# Patient Record
Sex: FEMALE | Race: WHITE | NOT HISPANIC OR LATINO
[De-identification: names, ages, dates, MRNs, and addresses within clinical notes are randomized per-mention and may not be internally consistent; named-entity substitution may affect disease eponyms.]

---

## 2024-07-10 ENCOUNTER — APPOINTMENT (OUTPATIENT)
Dept: OBGYN | Facility: CLINIC | Age: 29
End: 2024-07-10

## 2024-07-10 ENCOUNTER — ASOB RESULT (OUTPATIENT)
Age: 29
End: 2024-07-10

## 2024-07-10 ENCOUNTER — APPOINTMENT (OUTPATIENT)
Dept: ANTEPARTUM | Facility: CLINIC | Age: 29
End: 2024-07-10
Payer: COMMERCIAL

## 2024-07-10 DIAGNOSIS — Z00.00 ENCOUNTER FOR GENERAL ADULT MEDICAL EXAMINATION W/OUT ABNORMAL FINDINGS: ICD-10-CM

## 2024-07-10 DIAGNOSIS — Z36.82 ENCOUNTER FOR ANTENATAL SCREENING FOR NUCHAL TRANSLUCENCY: ICD-10-CM

## 2024-07-10 PROCEDURE — 76801 OB US < 14 WKS SINGLE FETUS: CPT

## 2024-07-10 PROCEDURE — 76813 OB US NUCHAL MEAS 1 GEST: CPT

## 2024-07-10 PROCEDURE — 36415 COLL VENOUS BLD VENIPUNCTURE: CPT

## 2024-07-10 PROCEDURE — 99204 OFFICE O/P NEW MOD 45 MIN: CPT

## 2024-07-14 LAB
ADDITIONAL US: NORMAL
CRL SCAN TWIN B: NORMAL
CRL SCAN: NORMAL
CROWN RUMP LENGTH TWIN B: NORMAL
CROWN RUMP LENGTH: 57.5 MM
DIA MOM: 0.81
DIA VALUE: 159 PG/ML
DOWN SYNDROME INTERPRETATION: NORMAL
DOWN SYNDROME SCREENING RISK: NORMAL
FIRST TRIMESTER SCREEN NOTE: NORMAL
FIRST TRIMESTER SCREEN TEST RESULTS: NORMAL
GEST. AGE ON COLLECTION DATE: 12 WEEKS
HCG MOM: 1.15
HCG VALUE: 84.1 IU/ML
MATERNAL AGE AT EDD: 31.3 YR
NT MOM TWIN B: NORMAL
NT TWIN B: NORMAL
NUCHAL TRANSLUCENCY (NT): 1.6 MM
NUCHAL TRANSLUCENCY MOM: 1.25
NUMBER OF FETUSES: 1
PAPP-A MOM: 0.46
PAPP-A VALUE: 231.4 NG/ML
TRISOMY 18 AGE RISK: NORMAL
TRISOMY 18 INTERPRETATION: NORMAL
TRISOMY 18 SCREENING RISK: NORMAL
WEIGHT AFP: 218 LBS

## 2024-08-28 ENCOUNTER — APPOINTMENT (OUTPATIENT)
Dept: OBGYN | Facility: CLINIC | Age: 29
End: 2024-08-28
Payer: COMMERCIAL

## 2024-08-28 ENCOUNTER — ASOB RESULT (OUTPATIENT)
Age: 29
End: 2024-08-28

## 2024-08-28 ENCOUNTER — APPOINTMENT (OUTPATIENT)
Dept: ANTEPARTUM | Facility: CLINIC | Age: 29
End: 2024-08-28
Payer: COMMERCIAL

## 2024-08-28 VITALS
HEIGHT: 62 IN | BODY MASS INDEX: 42.14 KG/M2 | SYSTOLIC BLOOD PRESSURE: 117 MMHG | WEIGHT: 229 LBS | DIASTOLIC BLOOD PRESSURE: 83 MMHG

## 2024-08-28 PROCEDURE — 76811 OB US DETAILED SNGL FETUS: CPT

## 2024-08-28 PROCEDURE — ZZZZZ: CPT

## 2024-08-28 NOTE — HISTORY OF PRESENT ILLNESS
[FreeTextEntry1] : 29 y/o female presents to office for anatomy scan. Patient is currently 19 weeks pregnant. Anatomy scan done today. No gross abnormalities noted. Normal growth. Normal fluid. Normal movement. +FHR (see official sono report).

## 2024-10-04 ENCOUNTER — EMERGENCY (EMERGENCY)
Facility: HOSPITAL | Age: 29
LOS: 1 days | Discharge: HOME | End: 2024-10-04
Attending: EMERGENCY MEDICINE | Admitting: EMERGENCY MEDICINE
Payer: COMMERCIAL

## 2024-10-04 VITALS
TEMPERATURE: 98 F | HEART RATE: 87 BPM | WEIGHT: 229.94 LBS | RESPIRATION RATE: 15 BRPM | OXYGEN SATURATION: 97 % | DIASTOLIC BLOOD PRESSURE: 63 MMHG | SYSTOLIC BLOOD PRESSURE: 98 MMHG

## 2024-10-04 VITALS
HEART RATE: 90 BPM | RESPIRATION RATE: 16 BRPM | OXYGEN SATURATION: 100 % | DIASTOLIC BLOOD PRESSURE: 62 MMHG | TEMPERATURE: 98 F | SYSTOLIC BLOOD PRESSURE: 110 MMHG

## 2024-10-04 LAB
ALBUMIN SERPL ELPH-MCNC: 3.3 G/DL — SIGNIFICANT CHANGE UP (ref 3.3–5)
ALP SERPL-CCNC: 62 U/L — SIGNIFICANT CHANGE UP (ref 40–120)
ALT FLD-CCNC: 14 U/L — SIGNIFICANT CHANGE UP (ref 4–33)
ANION GAP SERPL CALC-SCNC: 13 MMOL/L — SIGNIFICANT CHANGE UP (ref 7–14)
APPEARANCE UR: CLEAR — SIGNIFICANT CHANGE UP
AST SERPL-CCNC: 13 U/L — SIGNIFICANT CHANGE UP (ref 4–32)
BACTERIA # UR AUTO: ABNORMAL /HPF
BASOPHILS # BLD AUTO: 0.04 K/UL — SIGNIFICANT CHANGE UP (ref 0–0.2)
BASOPHILS NFR BLD AUTO: 0.4 % — SIGNIFICANT CHANGE UP (ref 0–2)
BILIRUB SERPL-MCNC: 0.3 MG/DL — SIGNIFICANT CHANGE UP (ref 0.2–1.2)
BILIRUB UR-MCNC: NEGATIVE — SIGNIFICANT CHANGE UP
BLOOD GAS VENOUS COMPREHENSIVE RESULT: SIGNIFICANT CHANGE UP
BUN SERPL-MCNC: 8 MG/DL — SIGNIFICANT CHANGE UP (ref 7–23)
CALCIUM SERPL-MCNC: 9.2 MG/DL — SIGNIFICANT CHANGE UP (ref 8.4–10.5)
CAST: 1 /LPF — SIGNIFICANT CHANGE UP (ref 0–4)
CHLORIDE SERPL-SCNC: 103 MMOL/L — SIGNIFICANT CHANGE UP (ref 98–107)
CO2 SERPL-SCNC: 21 MMOL/L — LOW (ref 22–31)
COLOR SPEC: YELLOW — SIGNIFICANT CHANGE UP
CREAT SERPL-MCNC: 0.66 MG/DL — SIGNIFICANT CHANGE UP (ref 0.5–1.3)
DIFF PNL FLD: NEGATIVE — SIGNIFICANT CHANGE UP
EGFR: 121 ML/MIN/1.73M2 — SIGNIFICANT CHANGE UP
EOSINOPHIL # BLD AUTO: 0.33 K/UL — SIGNIFICANT CHANGE UP (ref 0–0.5)
EOSINOPHIL NFR BLD AUTO: 3 % — SIGNIFICANT CHANGE UP (ref 0–6)
FLUAV AG NPH QL: SIGNIFICANT CHANGE UP
FLUBV AG NPH QL: SIGNIFICANT CHANGE UP
GLUCOSE SERPL-MCNC: 110 MG/DL — HIGH (ref 70–99)
GLUCOSE UR QL: NEGATIVE MG/DL — SIGNIFICANT CHANGE UP
HCG SERPL-ACNC: 5382 MIU/ML — SIGNIFICANT CHANGE UP
HCT VFR BLD CALC: 38 % — SIGNIFICANT CHANGE UP (ref 34.5–45)
HGB BLD-MCNC: 12.4 G/DL — SIGNIFICANT CHANGE UP (ref 11.5–15.5)
IANC: 7.76 K/UL — HIGH (ref 1.8–7.4)
IMM GRANULOCYTES NFR BLD AUTO: 0.6 % — SIGNIFICANT CHANGE UP (ref 0–0.9)
KETONES UR-MCNC: NEGATIVE MG/DL — SIGNIFICANT CHANGE UP
LEUKOCYTE ESTERASE UR-ACNC: ABNORMAL
LYMPHOCYTES # BLD AUTO: 1.9 K/UL — SIGNIFICANT CHANGE UP (ref 1–3.3)
LYMPHOCYTES # BLD AUTO: 17.6 % — SIGNIFICANT CHANGE UP (ref 13–44)
MCHC RBC-ENTMCNC: 29.1 PG — SIGNIFICANT CHANGE UP (ref 27–34)
MCHC RBC-ENTMCNC: 32.6 GM/DL — SIGNIFICANT CHANGE UP (ref 32–36)
MCV RBC AUTO: 89.2 FL — SIGNIFICANT CHANGE UP (ref 80–100)
MONOCYTES # BLD AUTO: 0.73 K/UL — SIGNIFICANT CHANGE UP (ref 0–0.9)
MONOCYTES NFR BLD AUTO: 6.7 % — SIGNIFICANT CHANGE UP (ref 2–14)
NEUTROPHILS # BLD AUTO: 7.76 K/UL — HIGH (ref 1.8–7.4)
NEUTROPHILS NFR BLD AUTO: 71.7 % — SIGNIFICANT CHANGE UP (ref 43–77)
NITRITE UR-MCNC: NEGATIVE — SIGNIFICANT CHANGE UP
NRBC # BLD: 0 /100 WBCS — SIGNIFICANT CHANGE UP (ref 0–0)
NRBC # FLD: 0 K/UL — SIGNIFICANT CHANGE UP (ref 0–0)
NT-PROBNP SERPL-SCNC: <36 PG/ML — SIGNIFICANT CHANGE UP
PH UR: 7 — SIGNIFICANT CHANGE UP (ref 5–8)
PLATELET # BLD AUTO: 173 K/UL — SIGNIFICANT CHANGE UP (ref 150–400)
POTASSIUM SERPL-MCNC: 4.6 MMOL/L — SIGNIFICANT CHANGE UP (ref 3.5–5.3)
POTASSIUM SERPL-SCNC: 4.6 MMOL/L — SIGNIFICANT CHANGE UP (ref 3.5–5.3)
PROT SERPL-MCNC: 6.9 G/DL — SIGNIFICANT CHANGE UP (ref 6–8.3)
PROT UR-MCNC: NEGATIVE MG/DL — SIGNIFICANT CHANGE UP
RBC # BLD: 4.26 M/UL — SIGNIFICANT CHANGE UP (ref 3.8–5.2)
RBC # FLD: 13.1 % — SIGNIFICANT CHANGE UP (ref 10.3–14.5)
RBC CASTS # UR COMP ASSIST: 0 /HPF — SIGNIFICANT CHANGE UP (ref 0–4)
REVIEW: SIGNIFICANT CHANGE UP
RSV RNA NPH QL NAA+NON-PROBE: SIGNIFICANT CHANGE UP
SARS-COV-2 RNA SPEC QL NAA+PROBE: SIGNIFICANT CHANGE UP
SODIUM SERPL-SCNC: 137 MMOL/L — SIGNIFICANT CHANGE UP (ref 135–145)
SP GR SPEC: 1.01 — SIGNIFICANT CHANGE UP (ref 1–1.03)
SQUAMOUS # UR AUTO: 2 /HPF — SIGNIFICANT CHANGE UP (ref 0–5)
UROBILINOGEN FLD QL: 0.2 MG/DL — SIGNIFICANT CHANGE UP (ref 0.2–1)
WBC # BLD: 10.82 K/UL — HIGH (ref 3.8–10.5)
WBC # FLD AUTO: 10.82 K/UL — HIGH (ref 3.8–10.5)
WBC UR QL: 3 /HPF — SIGNIFICANT CHANGE UP (ref 0–5)

## 2024-10-04 PROCEDURE — 99285 EMERGENCY DEPT VISIT HI MDM: CPT

## 2024-10-04 PROCEDURE — 93010 ELECTROCARDIOGRAM REPORT: CPT

## 2024-10-04 RX ORDER — NITROFURANTOIN MONOHYD/M-CRYST 100 MG
1 CAPSULE ORAL
Qty: 10 | Refills: 0
Start: 2024-10-04 | End: 2024-10-08

## 2024-10-04 RX ORDER — IPRATROPIUM BROMIDE AND ALBUTEROL SULFATE .5; 3 MG/3ML; MG/3ML
3 SOLUTION RESPIRATORY (INHALATION)
Refills: 0 | Status: COMPLETED | OUTPATIENT
Start: 2024-10-04 | End: 2024-10-04

## 2024-10-04 RX ORDER — SODIUM CHLORIDE 0.9 % (FLUSH) 0.9 %
1000 SYRINGE (ML) INJECTION ONCE
Refills: 0 | Status: COMPLETED | OUTPATIENT
Start: 2024-10-04 | End: 2024-10-04

## 2024-10-04 RX ORDER — PREDNISONE 5 MG/1
1 TABLET ORAL
Qty: 5 | Refills: 0
Start: 2024-10-04 | End: 2024-10-08

## 2024-10-04 RX ADMIN — IPRATROPIUM BROMIDE AND ALBUTEROL SULFATE 3 MILLILITER(S): .5; 3 SOLUTION RESPIRATORY (INHALATION) at 07:11

## 2024-10-04 RX ADMIN — IPRATROPIUM BROMIDE AND ALBUTEROL SULFATE 3 MILLILITER(S): .5; 3 SOLUTION RESPIRATORY (INHALATION) at 06:27

## 2024-10-04 RX ADMIN — IPRATROPIUM BROMIDE AND ALBUTEROL SULFATE 3 MILLILITER(S): .5; 3 SOLUTION RESPIRATORY (INHALATION) at 06:52

## 2024-10-04 RX ADMIN — Medication 1000 MILLILITER(S): at 06:27

## 2024-10-04 NOTE — ED PROVIDER NOTE - PROGRESS NOTE DETAILS
Brien Cage MD PGY-3: OBGYN aware to do NST (Spectra [id number]). Official consult not placed Chelsey Dumont PGY3 on reassessment patient no longer wheezing, feels better, has asymptomatic bacteruria will send abx to pharmacy.

## 2024-10-04 NOTE — ED PROVIDER NOTE - ATTENDING CONTRIBUTION TO CARE
DR. CUNHA, ATTENDING MD-  I performed a face to face bedside interview with the patient regarding history of present illness, review of symptoms and past medical history. I completed an independent physical exam.  I have discussed the patient's plan of care with the resident.   Documentation as above in the note.    27 y/o female h/o asthma 24 wks preg here with sob wheezing feels similar to asthma exac in past.  Pt well appearing, no acc muscle use, speaking in full sentences, diffuse end exp wheeze.  Will tx asthma exac, discuss with ob/gyn for NST.

## 2024-10-04 NOTE — CHART NOTE - NSCHARTNOTEFT_GEN_A_CORE
R3 OB NST Chart Note    24w5d  NST reviewed. Baseline 150/mod variability/+10x10 accels/-decels. [place] acontractile. Fetal status reassuring.    VTimmel PGY3

## 2024-10-04 NOTE — ED ADULT TRIAGE NOTE - CHIEF COMPLAINT QUOTE
24 weeks. . C/O asthma exacerbation. unrelieved by albuterol inhaler. Pt denies any OB concerns. No complaints of chest pain, headache, nausea, dizziness, vomiting, fever, chills verbalized..  Phx asthma.

## 2024-10-04 NOTE — ED PROVIDER NOTE - CLINICAL SUMMARY MEDICAL DECISION MAKING FREE TEXT BOX
Brien Cage MD PGY-3:  28-year-old female with PMH asthma diagnosed this past November follows with a pulmonologist who is also 24 weeks pregnant confirmed IUP on ultrasound last week follows with Dr. Artem Vann at [facility],  presents with 2 days of cough, shortness of breath, wheezing.  Patient has been using her albuterol inhaler at home without relief.  Denies fever, chills.  Patient was told by her OB/GYN to avoid steroids so has not been taking her Combivent that includes the steroids.  Denies chest pain, nausea, vomiting, abdominal pain, vaginal bleeding, cramping, dysuria, constipation, diarrhea.  Patient does not want to take steroids because her OB/GYN told her to avoid it as much as possible.    Gen: well appearing, NAD  HEENT: no conjunctivitis  Cardiac: regular rate rhythm, normal S1S2, not tachycardic  Chest: diffuse expiratory wheezing, not hypoxic 98% on RA  Abdomen: gravid, normal BS, soft, NT  Extremity: no gross deformity, good perfusion  Skin: no rash  Neuro: grossly normal    FHR 141bpm on POCUS    Will treat patient for asthma exacerbation but will hold off on steroids as patient declines even though was explained that steroids will be beneficial in this setting and is not contraindicated in pregnancy.  Will give DuoNebs, check labs, check urine for asymptomatic bacteriuria and call the OB/GYN team for an NST.  Dispo pending clinical course and results.

## 2024-10-04 NOTE — ED PROCEDURE NOTE - PROCEDURE ADDITIONAL DETAILS
R 141bpm    Emergency Department Focused Ultrasound performed at patient's bedside for educational purposes. The study will have a follow up study performed or was performed in the direct supervision of an ultrasound trained attending.

## 2024-10-04 NOTE — ED PROVIDER NOTE - NSFOLLOWUPINSTRUCTIONS_ED_ALL_ED_FT
You were seen in the ED for asthma. You were given Duonebs. Take albuterol at home 4x a day.    Follow up with your Primary Doctor and OBGYN within one week.    Return with any chest pain, difficulty breathing, leg swelling or any other concerning symptoms.    Asthma is a recurring condition in which the airways tighten and narrow, making it difficult to breath. Asthma exacerbations, also called asthma attacks, range from minor to life-threatening. Symptoms include wheezing, coughing, chest tightness, or shortness of breath. The diagnosis of asthma is made by a review of your medical history and a physical exam, but may involve additional testing. Asthma cannot be cured, but medicines and lifestyle changes can help control it. Avoid triggers of asthma which may include animal dander, pollen, mold, smoke, air pollutants, etc.     SEEK IMMEDIATE MEDICAL CARE IF YOU HAVE THE FOLLOWING SYMPTOMS: worsening of symptoms, symptoms that do not respond to medication, shortness of breath at rest, chest pain, bluish discoloration to lips or fingertips, lightheadedness/dizziness, or fever.

## 2024-10-04 NOTE — ED PROCEDURE NOTE - ATTENDING CONTRIBUTION TO CARE
MD CUNHA:  I was present for the critical portions of this procedure and provided direct attending supervision.

## 2024-10-04 NOTE — ED ADULT NURSE NOTE - OBJECTIVE STATEMENT
Patient received in spot 6. A&O4. Ambulatory. . 24 weeks pregnant. Past medial history of asthma and cirrhosis. Came into ED with complaints of asthma exacerbation. States for past 2 days has noticed increased wheezing and chest tightness unrelieved by inhaler. Patient states around 2am tightness got worse. Respirations even and unlabored. Denies SOB, Chest pain, N/V/D,fevers. No signs of acute distress noted. Stretcher in lowest position. Call bell within reach.

## 2024-10-04 NOTE — ED PROVIDER NOTE - PATIENT PORTAL LINK FT
You can access the FollowMyHealth Patient Portal offered by [facility] by registering at the following website: [email or web address]. By joining [facility]’s FollowMyHealth portal, you will also be able to view your health information using other applications (apps) compatible with our system.

## 2024-10-05 LAB
CULTURE RESULTS: SIGNIFICANT CHANGE UP
SPECIMEN SOURCE: SIGNIFICANT CHANGE UP

## 2024-11-01 ENCOUNTER — APPOINTMENT (OUTPATIENT)
Dept: ANTEPARTUM | Facility: CLINIC | Age: 29
End: 2024-11-01
Payer: COMMERCIAL

## 2024-11-01 ENCOUNTER — OUTPATIENT (OUTPATIENT)
Dept: INPATIENT UNIT | Facility: HOSPITAL | Age: 29
LOS: 1 days | Discharge: HOME | End: 2024-11-01

## 2024-11-01 ENCOUNTER — ASOB RESULT (OUTPATIENT)
Age: 29
End: 2024-11-01

## 2024-11-01 VITALS
SYSTOLIC BLOOD PRESSURE: 114 MMHG | HEART RATE: 96 BPM | DIASTOLIC BLOOD PRESSURE: 66 MMHG | RESPIRATION RATE: 16 BRPM | TEMPERATURE: 98 F | OXYGEN SATURATION: 99 %

## 2024-11-01 VITALS — DIASTOLIC BLOOD PRESSURE: 56 MMHG | SYSTOLIC BLOOD PRESSURE: 106 MMHG | HEART RATE: 71 BPM

## 2024-11-01 DIAGNOSIS — O26.899 OTHER SPECIFIED PREGNANCY RELATED CONDITIONS, UNSPECIFIED TRIMESTER: ICD-10-CM

## 2024-11-01 PROBLEM — J45.909 UNSPECIFIED ASTHMA, UNCOMPLICATED: Chronic | Status: ACTIVE | Noted: 2024-10-04

## 2024-11-01 LAB
APPEARANCE UR: CLEAR — SIGNIFICANT CHANGE UP
BACTERIA # UR AUTO: ABNORMAL /HPF
BILIRUB UR-MCNC: NEGATIVE — SIGNIFICANT CHANGE UP
CAST: 0 /LPF — SIGNIFICANT CHANGE UP (ref 0–4)
COLOR SPEC: YELLOW — SIGNIFICANT CHANGE UP
DIFF PNL FLD: NEGATIVE — SIGNIFICANT CHANGE UP
GLUCOSE UR QL: NEGATIVE MG/DL — SIGNIFICANT CHANGE UP
KETONES UR-MCNC: NEGATIVE MG/DL — SIGNIFICANT CHANGE UP
LEUKOCYTE ESTERASE UR-ACNC: ABNORMAL
NITRITE UR-MCNC: NEGATIVE — SIGNIFICANT CHANGE UP
PH UR: 6 — SIGNIFICANT CHANGE UP (ref 5–8)
PROT UR-MCNC: NEGATIVE MG/DL — SIGNIFICANT CHANGE UP
RBC CASTS # UR COMP ASSIST: 1 /HPF — SIGNIFICANT CHANGE UP (ref 0–4)
REVIEW: SIGNIFICANT CHANGE UP
SP GR SPEC: 1 — SIGNIFICANT CHANGE UP (ref 1–1.03)
SQUAMOUS # UR AUTO: 3 /HPF — SIGNIFICANT CHANGE UP (ref 0–5)
UROBILINOGEN FLD QL: 0.2 MG/DL — SIGNIFICANT CHANGE UP (ref 0.2–1)
WBC UR QL: 3 /HPF — SIGNIFICANT CHANGE UP (ref 0–5)

## 2024-11-01 PROCEDURE — 76815 OB US LIMITED FETUS(S): CPT | Mod: 26

## 2024-11-01 PROCEDURE — 76817 TRANSVAGINAL US OBSTETRIC: CPT | Mod: 26

## 2024-11-01 RX ORDER — PRENATAL VIT/IRON FUM/FOLIC AC 60 MG-1 MG
1 TABLET ORAL
Refills: 0 | DISCHARGE

## 2024-11-01 NOTE — OB RN TRIAGE NOTE - NS_TRIAGEADDITIONAL COMMENTS_OBGYN_ALL_OB_FT
patient waiting due to high triage volume charge rn aware, admitted patients to be moved from triage

## 2024-11-01 NOTE — OB PROVIDER TRIAGE NOTE - ADDITIONAL INSTRUCTIONS
Return for contractions, leaking of fluid, vaginal bleeding, decreased fetal movement  Fetal kick counts reviewed  oral hydration encouraged  follow up with OB at next scheduled appointment on 11/11

## 2024-11-01 NOTE — OB PROVIDER TRIAGE NOTE - NSOBPROVIDERNOTE_OBGYN_ALL_OB_FT
1400: Fetal and maternal status reassuring  -no evidence of PTL At this time  -tracing reviewed and plan discussed with Dr. Duarte   -to be discharged home 1400: Fetal and maternal status reassuring  -no evidence of PTL At this time  -light red spotting noted on TVS probe after SSE, likely related to exam   -tracing reviewed and plan discussed with Dr. Duarte   -to be discharged home

## 2024-11-01 NOTE — OB PROVIDER TRIAGE NOTE - NSHPPHYSICALEXAM_GEN_ALL_CORE
Vital Signs Last 24 Hrs  T(C): 36.5 (01 Nov 2024 11:35), Max: 36.5 (01 Nov 2024 11:06)  T(F): 97.7 (01 Nov 2024 11:35), Max: 97.7 (01 Nov 2024 11:06)  HR: 73 (01 Nov 2024 13:35) (71 - 96)  BP: 118/73 (01 Nov 2024 13:35) (101/57 - 118/73)  BP(mean): --  RR: 20 (01 Nov 2024 11:35) (16 - 20)  SpO2: 99% (01 Nov 2024 11:06) (99% - 99%)    Assessment reveals VSS  General: Female sitting comfortably in no apparent distress  Neuro: No facial asymmetry, no slurred speech, moves all 4 extremities  Mood: Alert and lucid, appropriate mood and affect  A&Ox3  Lungs- clear bilateral  Heart- normal rate and regular rhythm  Extremities- Warm, Dry, no edema present, good pulses   Abdomen soft, NT, gravid  Cat 1 tracing reactive, one late decerlation noted with return to baseline, irregular contractions noted   Transabdominal Ultrasound-breech, anterior placenta, PAN 13.47, m [id number], bpp 8/8- images saved ASOB  Sterile Speculum-Cervix appears closed, opague discharge noted in vault, nitrazine negative, fern negative   Transvaginal Ultrasound-CL 3.7 no funneling, no dynamic changes noted   Vaginal Exam- Vital Signs Last 24 Hrs  T(C): 36.5 (01 Nov 2024 11:35), Max: 36.5 (01 Nov 2024 11:06)  T(F): 97.7 (01 Nov 2024 11:35), Max: 97.7 (01 Nov 2024 11:06)  HR: 73 (01 Nov 2024 13:35) (71 - 96)  BP: 118/73 (01 Nov 2024 13:35) (101/57 - 118/73)  BP(mean): --  RR: 20 (01 Nov 2024 11:35) (16 - 20)  SpO2: 99% (01 Nov 2024 11:06) (99% - 99%)    Assessment reveals VSS  General: Female sitting comfortably in no apparent distress  Neuro: No facial asymmetry, no slurred speech, moves all 4 extremities  Mood: Alert and lucid, appropriate mood and affect  A&Ox3  Lungs- clear bilateral  Heart- normal rate and regular rhythm  Extremities- Warm, Dry, no edema present, good pulses   Abdomen soft, NT, gravid  Cat 1 tracing reactive, one late decerlation noted with return to baseline, irregular contractions noted   Transabdominal Ultrasound-breech, anterior placenta, PAN 13.47, m [id number], bpp 8/8- images saved ASOB  Sterile Speculum-Cervix appears closed, opague/white discharge noted in vault, nitrazine negative, fern negative   Transvaginal Ultrasound-CL 3.7 no funneling, no dynamic changes noted Vital Signs Last 24 Hrs  T(C): 36.5 (01 Nov 2024 11:35), Max: 36.5 (01 Nov 2024 11:06)  T(F): 97.7 (01 Nov 2024 11:35), Max: 97.7 (01 Nov 2024 11:06)  HR: 73 (01 Nov 2024 13:35) (71 - 96)  BP: 118/73 (01 Nov 2024 13:35) (101/57 - 118/73)  BP(mean): --  RR: 20 (01 Nov 2024 11:35) (16 - 20)  SpO2: 99% (01 Nov 2024 11:06) (99% - 99%)    Assessment reveals VSS  General: Female sitting comfortably in no apparent distress  Neuro: No facial asymmetry, no slurred speech, moves all 4 extremities  Mood: Alert and lucid, appropriate mood and affect  A&Ox3  Lungs- clear bilateral  Heart- normal rate and regular rhythm  Extremities- Warm, Dry, no edema present, good pulses   Abdomen soft, NT, gravid  Cat 1 tracing reactive, one late decelerations noted with return to baseline, irregular contractions noted   Transabdominal Ultrasound-breech, anterior placenta, PAN 13.47, m [id number], bpp 8/8- images saved ASOB  Sterile Speculum-Cervix appears closed, no pooling noted, opague/white discharge noted in vault, no active bleeding or old blood noted, Nitrazine negative, fern negative  Transvaginal Ultrasound-CL 3.7 no funneling, no dynamic changes noted

## 2024-11-01 NOTE — OB PROVIDER TRIAGE NOTE - HISTORY OF PRESENT ILLNESS
29 year old  at 28.5 presents with complaints of irregular cramping that started at 4am, denies leaking of fluid, vaginal bleeding, reports good fetal movement. Patient reports having vaginal discharge over the last 4 months. Denies ob complications.   HX: asthma denies hospitalizations,  ablation to her back for herniated discs.   Meds: PNV, albuterol PRN     PNC: Dr. Vann

## 2024-11-01 NOTE — OB PROVIDER TRIAGE NOTE - PLAN OF CARE
Fetal and maternal status reassuring  no evidence of ptl at this time   tracing and plan reviewed with Dr. Duarte  to be discharged home     PTL precautions reviewed  Fetal kick counts reviewed  oral hydration encouraged  follow up with ob at next scheduled appointment 11/11    discharged at: 2138

## 2024-11-01 NOTE — OB PROVIDER TRIAGE NOTE - NSHPLABSRESULTS_GEN_ALL_CORE
Urinalysis Basic - ( 2024 12:55 )    Color: Yellow / Appearance: Clear / S.005 / pH: x  Gluc: x / Ketone: Negative mg/dL  / Bili: Negative / Urobili: 0.2 mg/dL   Blood: x / Protein: Negative mg/dL / Nitrite: Negative   Leuk Esterase: Trace / RBC: 1 /HPF / WBC 3 /HPF   Sq Epi: x / Non Sq Epi: 3 /HPF / Bacteria: Occasional /HPF

## 2024-11-06 DIAGNOSIS — O26.893 OTHER SPECIFIED PREGNANCY RELATED CONDITIONS, THIRD TRIMESTER: ICD-10-CM

## 2024-11-06 DIAGNOSIS — L40.9 PSORIASIS, UNSPECIFIED: ICD-10-CM

## 2024-11-06 DIAGNOSIS — N89.8 OTHER SPECIFIED NONINFLAMMATORY DISORDERS OF VAGINA: ICD-10-CM

## 2024-11-06 DIAGNOSIS — O26.853 SPOTTING COMPLICATING PREGNANCY, THIRD TRIMESTER: ICD-10-CM

## 2024-11-06 DIAGNOSIS — J45.909 UNSPECIFIED ASTHMA, UNCOMPLICATED: ICD-10-CM

## 2024-11-06 DIAGNOSIS — O99.713 DISEASES OF THE SKIN AND SUBCUTANEOUS TISSUE COMPLICATING PREGNANCY, THIRD TRIMESTER: ICD-10-CM

## 2024-11-06 DIAGNOSIS — O99.891 OTHER SPECIFIED DISEASES AND CONDITIONS COMPLICATING PREGNANCY: ICD-10-CM

## 2024-11-06 DIAGNOSIS — Z3A.28 28 WEEKS GESTATION OF PREGNANCY: ICD-10-CM

## 2024-11-06 DIAGNOSIS — O99.513 DISEASES OF THE RESPIRATORY SYSTEM COMPLICATING PREGNANCY, THIRD TRIMESTER: ICD-10-CM

## 2024-12-02 ENCOUNTER — OUTPATIENT (OUTPATIENT)
Dept: INPATIENT UNIT | Facility: HOSPITAL | Age: 29
LOS: 1 days | Discharge: HOME | End: 2024-12-02
Payer: COMMERCIAL

## 2024-12-02 ENCOUNTER — APPOINTMENT (OUTPATIENT)
Dept: ANTEPARTUM | Facility: CLINIC | Age: 29
End: 2024-12-02

## 2024-12-02 VITALS
DIASTOLIC BLOOD PRESSURE: 67 MMHG | RESPIRATION RATE: 16 BRPM | HEART RATE: 97 BPM | TEMPERATURE: 98 F | SYSTOLIC BLOOD PRESSURE: 107 MMHG

## 2024-12-02 DIAGNOSIS — O26.899 OTHER SPECIFIED PREGNANCY RELATED CONDITIONS, UNSPECIFIED TRIMESTER: ICD-10-CM

## 2024-12-02 LAB
APPEARANCE UR: ABNORMAL
BILIRUB UR-MCNC: NEGATIVE — SIGNIFICANT CHANGE UP
COLOR SPEC: YELLOW — SIGNIFICANT CHANGE UP
DIFF PNL FLD: NEGATIVE — SIGNIFICANT CHANGE UP
GLUCOSE UR QL: NEGATIVE MG/DL — SIGNIFICANT CHANGE UP
KETONES UR-MCNC: ABNORMAL MG/DL
LEUKOCYTE ESTERASE UR-ACNC: ABNORMAL
NITRITE UR-MCNC: NEGATIVE — SIGNIFICANT CHANGE UP
PH UR: 5.5 — SIGNIFICANT CHANGE UP (ref 5–8)
PROT UR-MCNC: NEGATIVE MG/DL — SIGNIFICANT CHANGE UP
SP GR SPEC: 1.02 — SIGNIFICANT CHANGE UP (ref 1–1.03)
UROBILINOGEN FLD QL: 0.2 MG/DL — SIGNIFICANT CHANGE UP (ref 0.2–1)

## 2024-12-02 PROCEDURE — 99221 1ST HOSP IP/OBS SF/LOW 40: CPT | Mod: 25

## 2024-12-02 PROCEDURE — 59025 FETAL NON-STRESS TEST: CPT | Mod: 26

## 2024-12-03 ENCOUNTER — ASOB RESULT (OUTPATIENT)
Age: 29
End: 2024-12-03

## 2024-12-03 VITALS — SYSTOLIC BLOOD PRESSURE: 121 MMHG | HEART RATE: 90 BPM | DIASTOLIC BLOOD PRESSURE: 74 MMHG

## 2024-12-03 DIAGNOSIS — Z87.39 PERSONAL HISTORY OF OTHER DISEASES OF THE MUSCULOSKELETAL SYSTEM AND CONNECTIVE TISSUE: Chronic | ICD-10-CM

## 2024-12-03 PROBLEM — L40.9 PSORIASIS, UNSPECIFIED: Chronic | Status: ACTIVE | Noted: 2024-11-01

## 2024-12-03 LAB
BACTERIA # UR AUTO: ABNORMAL /HPF
CAST: 0 /LPF — SIGNIFICANT CHANGE UP (ref 0–4)
COD CRY URNS QL: PRESENT
RBC CASTS # UR COMP ASSIST: 2 /HPF — SIGNIFICANT CHANGE UP (ref 0–4)
REVIEW: SIGNIFICANT CHANGE UP
SQUAMOUS # UR AUTO: 5 /HPF — SIGNIFICANT CHANGE UP (ref 0–5)
WBC UR QL: 9 /HPF — HIGH (ref 0–5)

## 2024-12-03 NOTE — OB PROVIDER TRIAGE NOTE - HISTORY OF PRESENT ILLNESS
28y/o  @33.1wks presents with cramping, was on the left side now on her right side. Pain scale 5/10  Reports good fetal movement  Denies LOF/VB    Allergies: Denies  Medications: PNV, Albuterol PRN  NPO 1800

## 2024-12-03 NOTE — OB PROVIDER TRIAGE NOTE - NSHPLABSRESULTS_GEN_ALL_CORE
Urinalysis Basic - ( 02 Dec 2024 23:20 )    Color: Yellow / Appearance: Cloudy / S.022 / pH: x  Gluc: x / Ketone: Trace mg/dL  / Bili: Negative / Urobili: 0.2 mg/dL   Blood: x / Protein: Negative mg/dL / Nitrite: Negative   Leuk Esterase: Small / RBC: 2 /HPF / WBC 9 /HPF   Sq Epi: x / Non Sq Epi: 5 /HPF / Bacteria: Few /HPF

## 2024-12-03 NOTE — OB PROVIDER TRIAGE NOTE - PLAN OF CARE
D/C Home  D/W Dr. Guzman and Dr. Alvarado  No evidence of acute process  Normal fetal testing  Follow up at next scheduled prenatal appointment  Return for decreased fetal movement, loss of fluid or vaginal bleeding  Increase oral hydration  Signs and Symptoms of Labor reviewed   Kick Counts Reviewed

## 2024-12-03 NOTE — OB PROVIDER TRIAGE NOTE - NSHPPHYSICALEXAM_GEN_ALL_CORE
Vital Signs Last 24 Hrs  T(C): 36.5 (02 Dec 2024 23:03), Max: 36.5 (02 Dec 2024 20:36)  T(F): 97.7 (02 Dec 2024 23:03), Max: 97.7 (02 Dec 2024 20:36)  HR: 79 (03 Dec 2024 00:42) (72 - 97)  BP: 120/58 (03 Dec 2024 00:42) (106/56 - 129/57)  RR: 16 (02 Dec 2024 23:03) (16 - 16)    Assessment reveals VSS  General: Female sitting comfortably in no apparent distress  Neuro: No facial asymmetry, no slurred speech, moves all 4 extremities  Mood: Alert and lucid, appropriate mood and affect  A&Ox3  Lungs- clear bilateral  Heart- normal rate and regular rhythm  Extremities- Warm, Dry, no edema present, good pulses   Abdomen soft, NT, gravid  Cat 1 tracing reactive, ctx every 8-15mins   Transabdominal Ultrasound- images saved ASOB, vtx, ant placenta, bpp8/8, aisha: 15.45  Transvaginal Ultrasound- cervical length 4.0cm, no funneling or dynamic changes        PLAN:  Urinalysis

## 2024-12-04 DIAGNOSIS — O99.713 DISEASES OF THE SKIN AND SUBCUTANEOUS TISSUE COMPLICATING PREGNANCY, THIRD TRIMESTER: ICD-10-CM

## 2024-12-04 DIAGNOSIS — L40.9 PSORIASIS, UNSPECIFIED: ICD-10-CM

## 2024-12-04 DIAGNOSIS — Z3A.33 33 WEEKS GESTATION OF PREGNANCY: ICD-10-CM

## 2024-12-04 DIAGNOSIS — O26.893 OTHER SPECIFIED PREGNANCY RELATED CONDITIONS, THIRD TRIMESTER: ICD-10-CM

## 2024-12-04 DIAGNOSIS — J45.909 UNSPECIFIED ASTHMA, UNCOMPLICATED: ICD-10-CM

## 2024-12-04 DIAGNOSIS — R10.9 UNSPECIFIED ABDOMINAL PAIN: ICD-10-CM

## 2024-12-04 DIAGNOSIS — O99.513 DISEASES OF THE RESPIRATORY SYSTEM COMPLICATING PREGNANCY, THIRD TRIMESTER: ICD-10-CM

## 2024-12-04 LAB
CULTURE RESULTS: SIGNIFICANT CHANGE UP
SPECIMEN SOURCE: SIGNIFICANT CHANGE UP

## 2025-01-01 ENCOUNTER — ASOB RESULT (OUTPATIENT)
Age: 30
End: 2025-01-01

## 2025-01-01 ENCOUNTER — APPOINTMENT (OUTPATIENT)
Dept: ANTEPARTUM | Facility: CLINIC | Age: 30
End: 2025-01-01
Payer: COMMERCIAL

## 2025-01-01 ENCOUNTER — OUTPATIENT (OUTPATIENT)
Dept: OUTPATIENT SERVICES | Facility: HOSPITAL | Age: 30
LOS: 1 days | Discharge: HOME | End: 2025-01-01
Payer: COMMERCIAL

## 2025-01-01 VITALS — DIASTOLIC BLOOD PRESSURE: 73 MMHG | SYSTOLIC BLOOD PRESSURE: 117 MMHG | HEART RATE: 75 BPM

## 2025-01-01 VITALS
TEMPERATURE: 98 F | SYSTOLIC BLOOD PRESSURE: 118 MMHG | HEART RATE: 118 BPM | DIASTOLIC BLOOD PRESSURE: 79 MMHG | RESPIRATION RATE: 16 BRPM

## 2025-01-01 DIAGNOSIS — Z87.39 PERSONAL HISTORY OF OTHER DISEASES OF THE MUSCULOSKELETAL SYSTEM AND CONNECTIVE TISSUE: Chronic | ICD-10-CM

## 2025-01-01 DIAGNOSIS — O26.899 OTHER SPECIFIED PREGNANCY RELATED CONDITIONS, UNSPECIFIED TRIMESTER: ICD-10-CM

## 2025-01-01 PROCEDURE — 76819 FETAL BIOPHYS PROFIL W/O NST: CPT | Mod: 26

## 2025-01-01 PROCEDURE — 59025 FETAL NON-STRESS TEST: CPT | Mod: 26

## 2025-01-01 PROCEDURE — 99221 1ST HOSP IP/OBS SF/LOW 40: CPT | Mod: 25

## 2025-01-01 NOTE — OB PROVIDER TRIAGE NOTE - NSOBPROVIDERNOTE_OBGYN_ALL_OB_FT
NST in progress  for BPP   will continue to monitor NST in progress  for BPP   will continue to monitor    __________________________________________________________________________  CHERI Scott NP  1900  Patient presents with decreased fetal movement  Received care of patient  NST in progress    19:45  NST Cat 1 tracing, reactive   BPP8/8, vtx, ant placenta, afi16.46, good fetal movement on ultrasound  Patient feels movement and sees movement on ultrasound NST in progress  for BPP   will continue to monitor    __________________________________________________________________________  CHERI Scott NP  1900  Patient presents with decreased fetal movement  Received care of patient  NST in progress    19:45  NST Cat 1 tracing, reactive   BPP8/8, vtx, ant placenta, afi16.46, good fetal movement on ultrasound  Patient feels movement and sees movement during ultrasound

## 2025-01-01 NOTE — OB RN TRIAGE NOTE - FALL HARM RISK - UNIVERSAL INTERVENTIONS
Bed in lowest position, wheels locked, appropriate side rails in place/Call bell, personal items and telephone in reach/Instruct patient to call for assistance before getting out of bed or chair/Non-slip footwear when patient is out of bed/[place] to call system/Physically safe environment - no spills, clutter or unnecessary equipment/Purposeful Proactive Rounding/Room/bathroom lighting operational, light cord in reach

## 2025-01-01 NOTE — OB PROVIDER TRIAGE NOTE - HISTORY OF PRESENT ILLNESS
29 year old  at 37.3 presents to triage with complaints of decreased fetal movement since this am. States she has felt slight movements but today it has not been as strong. Reports feeling irregular contraction, denies pain or discomfort, denies leaking of fluid and vaginal bleeding. Denies ob complications.   hx: Asthma, Psoriasis,.s/p ablation of back  for herniated discs  Meds: Albuterol last used a few weeks ago, PNV     PNC: Dr. Vann

## 2025-01-01 NOTE — OB PROVIDER TRIAGE NOTE - NS_OBGYNHISTORY_OBGYN_ALL_OB_FT
AP course uncomplicated  OB: Denies  GYN: Denies    GBS negative AP course uncomplicated  OB: Denies  GYN: Denies    GBS negative  PNC: Dr. Vann

## 2025-01-01 NOTE — OB PROVIDER TRIAGE NOTE - ADDITIONAL INSTRUCTIONS
Follow up at next scheduled prenatal appointment, follow up Friday with NST/BPP  Return for decreased fetal movement, loss of fluid or vaginal bleeding  Increase oral hydration  Signs and Symptoms of Labor reviewed   Kick Counts Reviewed

## 2025-01-01 NOTE — OB RN TRIAGE NOTE - NS_RSVVACCINERECEIVED_OBGYN_ALL_OB
Return OB Visit    21 y.o. female  at 32w3d   C/o some nausea at night and in the morning.   Reports good fetal movement or fluttering. Denies any vaginal bleeding, leakage of fluid, cramping, contractions, or pressure.       Vitals  BP: 131/84  Pulse: 94  Weight: 83.6 kg (184 lb 3.2 oz)  Prenatal  Fundal Height (cm): 32 cm  Fetal Heart Rate: 150    Prenatal Labs:  Lab Results   Component Value Date    GROUPTRH B POS 2022    HGB 9.9 (L) 2023    HCT 31.5 (L) 2023     2023    SICKLE Negative 2022    HEPBSAG Non-Reactive 2022    [id number] Non-Reactive 2022    LABNGO Negative 2022    LABURIN Skin/Urogenital Kandice Isolated, no further workup. 2023       A: 32w3d           ICD-10-CM ICD-9-CM    1. HSV-2 infection complicating pregnancy, third trimester  O98.513 647.63     B00.9 054.9       2. 32 weeks gestation of pregnancy  Z3A.32 V22.2 POCT URINALYSIS W/O SCOPE          P: Bleeding, daily fetal kick counts, and  labor/labor precautions discussed.    No pregnancy checklist tasks were completed during this visit, and no tasks are pending completion.      Orders Placed This Encounter   Procedures    Tdap Vaccine    POCT URINALYSIS W/O SCOPE       Questions answered to desired level of satisfaction  Verbalized understanding to all information and instructions provided.  Follow up in about 2 weeks (around 3/6/2023) for OBV.    Alyse Boswell, DO OBGYN [facility]        
Temp: 98.5  O2:100%  
No

## 2025-01-01 NOTE — OB PROVIDER TRIAGE NOTE - NSHPPHYSICALEXAM_GEN_ALL_CORE
Vital Signs Last 24 Hrs  T(C): 36.5 (01 Jan 2025 18:01), Max: 36.5 (01 Jan 2025 18:01)  T(F): 97.7 (01 Jan 2025 18:01), Max: 97.7 (01 Jan 2025 18:01)  HR: 110 (01 Jan 2025 18:16) (110 - 118)  BP: 122/70 (01 Jan 2025 18:16) (118/79 - 122/70)  BP(mean): --  RR: 16 (01 Jan 2025 18:01) (16 - 16)  SpO2: --    Assessment reveals VSS  General: Female sitting comfortably in no apparent distress  Neuro: No facial asymmetry, no slurred speech, moves all 4 extremities  Mood: Alert and lucid, appropriate mood and affect  A&Ox3  Lungs- clear bilateral  Heart- normal rate and regular rhythm  Extremities- Warm, Dry, no edema present, good pulses   Abdomen soft, NT, gravid  Cat 1 tracing reactive, irregular contractions   Transabdominal Ultrasound- images saved ASOB  Vaginal Exam- Vital Signs Last 24 Hrs  T(C): 36.5 (01 Jan 2025 18:01), Max: 36.5 (01 Jan 2025 18:01)  T(F): 97.7 (01 Jan 2025 18:01), Max: 97.7 (01 Jan 2025 18:01)  HR: 110 (01 Jan 2025 18:16) (110 - 118)  BP: 122/70 (01 Jan 2025 18:16) (118/79 - 122/70)  RR: 16 (01 Jan 2025 18:01) (16 - 16)    Assessment reveals VSS  General: Female sitting comfortably in no apparent distress  Neuro: No facial asymmetry, no slurred speech, moves all 4 extremities  Mood: Alert and lucid, appropriate mood and affect  A&Ox3  Lungs- clear bilateral  Heart- normal rate and regular rhythm  Extremities- Warm, Dry, no edema present, good pulses   Abdomen soft, NT, gravid  Cat 1 tracing reactive, irregular contractions   Transabdominal Ultrasound- images saved ASOB, vtx, ant placenta, bpp8/8, aisha:16.46

## 2025-01-01 NOTE — OB PROVIDER TRIAGE NOTE - NS_FHOBSERVED_OBGYN_ALL_OB
GENERAL: patient appears well, NAD  EYES: sclera clear, no exudates  ENMT: oropharynx clear without erythema, no exudates, moist mucous membranes  NECK: supple, soft, no thyromegaly noted  LUNGS: good air entry bilaterally, clear to auscultation, symmetric breath sounds, no wheezing, rhonchi or rales appreciated  HEART: S1/S2, regular rate and rhythm, no murmurs noted, no lower extremity edema  GASTROINTESTINAL: abdomen is soft, nontender, nondistended, normoactive bowel sounds, no palpable masses  INTEGUMENT: good skin turgor, no lesions noted  MUSCULOSKELETAL: no clubbing or cyanosis, no obvious deformity  NEUROLOGIC: AAO x3, good muscle tone in 4 extremities, no obvious sensory deficits  PSYCHIATRIC: mood is good, affect is congruent, linear and logical thought process Yes

## 2025-01-01 NOTE — OB PROVIDER TRIAGE NOTE - PLAN OF CARE
D/C Home @37.3wks  D/W Dr. Tobar  No evidence of acute process  Normal fetal testing  BPP8/8  Follow up at next scheduled prenatal appointment, follow up Friday with NST/BPP  Return for decreased fetal movement, loss of fluid or vaginal bleeding  Increase oral hydration  Signs and Symptoms of Labor reviewed   Kick Counts Reviewed D/C Home @37.3wks  D/W Dr. oTbar  No evidence of acute process  Normal fetal testing  BPP8/8  Follow up at next scheduled prenatal appointment, follow up Friday with NST/BPP  Return for decreased fetal movement, loss of fluid or vaginal bleeding  Increase oral hydration  Signs and Symptoms of Labor reviewed   Kick Counts Reviewed  D/C @20:05

## 2025-01-03 ENCOUNTER — APPOINTMENT (OUTPATIENT)
Dept: ANTEPARTUM | Facility: CLINIC | Age: 30
End: 2025-01-03
Payer: COMMERCIAL

## 2025-01-03 ENCOUNTER — ASOB RESULT (OUTPATIENT)
Age: 30
End: 2025-01-03

## 2025-01-03 DIAGNOSIS — O35.9XX0 MATERNAL CARE FOR (SUSPECTED) FETAL ABNORMALITY AND DAMAGE, UNSPECIFIED, NOT APPLICABLE OR UNSPECIFIED: ICD-10-CM

## 2025-01-03 PROCEDURE — 76816 OB US FOLLOW-UP PER FETUS: CPT

## 2025-01-03 PROCEDURE — 76818 FETAL BIOPHYS PROFILE W/NST: CPT

## 2025-01-03 PROCEDURE — ZZZZZ: CPT

## 2025-01-03 PROCEDURE — 99212 OFFICE O/P EST SF 10 MIN: CPT | Mod: 25

## 2025-01-06 ENCOUNTER — OUTPATIENT (OUTPATIENT)
Dept: INPATIENT UNIT | Facility: HOSPITAL | Age: 30
LOS: 1 days | Discharge: HOME | End: 2025-01-06
Payer: COMMERCIAL

## 2025-01-06 ENCOUNTER — APPOINTMENT (OUTPATIENT)
Dept: ANTEPARTUM | Facility: CLINIC | Age: 30
End: 2025-01-06

## 2025-01-06 VITALS
SYSTOLIC BLOOD PRESSURE: 115 MMHG | OXYGEN SATURATION: 99 % | TEMPERATURE: 98 F | RESPIRATION RATE: 20 BRPM | HEART RATE: 93 BPM | DIASTOLIC BLOOD PRESSURE: 70 MMHG

## 2025-01-06 VITALS — OXYGEN SATURATION: 94 % | HEART RATE: 91 BPM

## 2025-01-06 DIAGNOSIS — O36.8130 DECREASED FETAL MOVEMENTS, THIRD TRIMESTER, NOT APPLICABLE OR UNSPECIFIED: ICD-10-CM

## 2025-01-06 DIAGNOSIS — O99.513 DISEASES OF THE RESPIRATORY SYSTEM COMPLICATING PREGNANCY, THIRD TRIMESTER: ICD-10-CM

## 2025-01-06 DIAGNOSIS — O26.899 OTHER SPECIFIED PREGNANCY RELATED CONDITIONS, UNSPECIFIED TRIMESTER: ICD-10-CM

## 2025-01-06 DIAGNOSIS — O47.1 FALSE LABOR AT OR AFTER 37 COMPLETED WEEKS OF GESTATION: ICD-10-CM

## 2025-01-06 DIAGNOSIS — Z3A.37 37 WEEKS GESTATION OF PREGNANCY: ICD-10-CM

## 2025-01-06 DIAGNOSIS — O99.713 DISEASES OF THE SKIN AND SUBCUTANEOUS TISSUE COMPLICATING PREGNANCY, THIRD TRIMESTER: ICD-10-CM

## 2025-01-06 DIAGNOSIS — Z87.39 PERSONAL HISTORY OF OTHER DISEASES OF THE MUSCULOSKELETAL SYSTEM AND CONNECTIVE TISSUE: Chronic | ICD-10-CM

## 2025-01-06 DIAGNOSIS — J45.909 UNSPECIFIED ASTHMA, UNCOMPLICATED: ICD-10-CM

## 2025-01-06 DIAGNOSIS — L40.9 PSORIASIS, UNSPECIFIED: ICD-10-CM

## 2025-01-06 LAB
ADD ON TEST-SPECIMEN IN LAB: SIGNIFICANT CHANGE UP
ALBUMIN SERPL ELPH-MCNC: 3.3 G/DL — SIGNIFICANT CHANGE UP (ref 3.3–5)
ALP SERPL-CCNC: 103 U/L — SIGNIFICANT CHANGE UP (ref 40–120)
ALT FLD-CCNC: 13 U/L — SIGNIFICANT CHANGE UP (ref 4–33)
ANION GAP SERPL CALC-SCNC: 13 MMOL/L — SIGNIFICANT CHANGE UP (ref 7–14)
APPEARANCE UR: CLEAR — SIGNIFICANT CHANGE UP
AST SERPL-CCNC: 14 U/L — SIGNIFICANT CHANGE UP (ref 4–32)
BACTERIA # UR AUTO: ABNORMAL /HPF
BASOPHILS # BLD AUTO: 0.04 K/UL — SIGNIFICANT CHANGE UP (ref 0–0.2)
BASOPHILS NFR BLD AUTO: 0.4 % — SIGNIFICANT CHANGE UP (ref 0–2)
BILIRUB SERPL-MCNC: 0.6 MG/DL — SIGNIFICANT CHANGE UP (ref 0.2–1.2)
BILIRUB UR-MCNC: NEGATIVE — SIGNIFICANT CHANGE UP
BUN SERPL-MCNC: 7 MG/DL — SIGNIFICANT CHANGE UP (ref 7–23)
CALCIUM SERPL-MCNC: 9.2 MG/DL — SIGNIFICANT CHANGE UP (ref 8.4–10.5)
CAST: 0 /LPF — SIGNIFICANT CHANGE UP (ref 0–4)
CHLORIDE SERPL-SCNC: 99 MMOL/L — SIGNIFICANT CHANGE UP (ref 98–107)
CO2 SERPL-SCNC: 20 MMOL/L — LOW (ref 22–31)
COLOR SPEC: YELLOW — SIGNIFICANT CHANGE UP
CREAT ?TM UR-MCNC: 20 MG/DL — SIGNIFICANT CHANGE UP
CREAT SERPL-MCNC: 0.57 MG/DL — SIGNIFICANT CHANGE UP (ref 0.5–1.3)
DIFF PNL FLD: NEGATIVE — SIGNIFICANT CHANGE UP
EGFR: 125 ML/MIN/1.73M2 — SIGNIFICANT CHANGE UP
EOSINOPHIL # BLD AUTO: 0.23 K/UL — SIGNIFICANT CHANGE UP (ref 0–0.5)
EOSINOPHIL NFR BLD AUTO: 2.2 % — SIGNIFICANT CHANGE UP (ref 0–6)
GLUCOSE SERPL-MCNC: 81 MG/DL — SIGNIFICANT CHANGE UP (ref 70–99)
GLUCOSE UR QL: NEGATIVE MG/DL — SIGNIFICANT CHANGE UP
HCT VFR BLD CALC: 39.9 % — SIGNIFICANT CHANGE UP (ref 34.5–45)
HGB BLD-MCNC: 13 G/DL — SIGNIFICANT CHANGE UP (ref 11.5–15.5)
IANC: 7.21 K/UL — SIGNIFICANT CHANGE UP (ref 1.8–7.4)
IMM GRANULOCYTES NFR BLD AUTO: 0.6 % — SIGNIFICANT CHANGE UP (ref 0–0.9)
KETONES UR-MCNC: ABNORMAL MG/DL
LDH SERPL L TO P-CCNC: 160 U/L — SIGNIFICANT CHANGE UP (ref 135–225)
LEUKOCYTE ESTERASE UR-ACNC: ABNORMAL
LYMPHOCYTES # BLD AUTO: 2.14 K/UL — SIGNIFICANT CHANGE UP (ref 1–3.3)
LYMPHOCYTES # BLD AUTO: 20.7 % — SIGNIFICANT CHANGE UP (ref 13–44)
MAGNESIUM SERPL-MCNC: 1.9 MG/DL — SIGNIFICANT CHANGE UP (ref 1.6–2.6)
MCHC RBC-ENTMCNC: 28.1 PG — SIGNIFICANT CHANGE UP (ref 27–34)
MCHC RBC-ENTMCNC: 32.6 G/DL — SIGNIFICANT CHANGE UP (ref 32–36)
MCV RBC AUTO: 86.2 FL — SIGNIFICANT CHANGE UP (ref 80–100)
MONOCYTES # BLD AUTO: 0.66 K/UL — SIGNIFICANT CHANGE UP (ref 0–0.9)
MONOCYTES NFR BLD AUTO: 6.4 % — SIGNIFICANT CHANGE UP (ref 2–14)
NEUTROPHILS # BLD AUTO: 7.21 K/UL — SIGNIFICANT CHANGE UP (ref 1.8–7.4)
NEUTROPHILS NFR BLD AUTO: 69.7 % — SIGNIFICANT CHANGE UP (ref 43–77)
NITRITE UR-MCNC: NEGATIVE — SIGNIFICANT CHANGE UP
NRBC # BLD: 0 /100 WBCS — SIGNIFICANT CHANGE UP (ref 0–0)
NRBC # FLD: 0 K/UL — SIGNIFICANT CHANGE UP (ref 0–0)
PH UR: 6 — SIGNIFICANT CHANGE UP (ref 5–8)
PLATELET # BLD AUTO: 187 K/UL — SIGNIFICANT CHANGE UP (ref 150–400)
POTASSIUM SERPL-MCNC: 4 MMOL/L — SIGNIFICANT CHANGE UP (ref 3.5–5.3)
POTASSIUM SERPL-SCNC: 4 MMOL/L — SIGNIFICANT CHANGE UP (ref 3.5–5.3)
PROT ?TM UR-MCNC: 4 MG/DL — SIGNIFICANT CHANGE UP
PROT SERPL-MCNC: 6.9 G/DL — SIGNIFICANT CHANGE UP (ref 6–8.3)
PROT UR-MCNC: NEGATIVE MG/DL — SIGNIFICANT CHANGE UP
PROT/CREAT UR-RTO: 0.2 RATIO — SIGNIFICANT CHANGE UP (ref 0–0.2)
RBC # BLD: 4.63 M/UL — SIGNIFICANT CHANGE UP (ref 3.8–5.2)
RBC # FLD: 13.8 % — SIGNIFICANT CHANGE UP (ref 10.3–14.5)
RBC CASTS # UR COMP ASSIST: 0 /HPF — SIGNIFICANT CHANGE UP (ref 0–4)
REVIEW: SIGNIFICANT CHANGE UP
SODIUM SERPL-SCNC: 132 MMOL/L — LOW (ref 135–145)
SP GR SPEC: 1 — LOW (ref 1–1.03)
SQUAMOUS # UR AUTO: 2 /HPF — SIGNIFICANT CHANGE UP (ref 0–5)
URATE SERPL-MCNC: 5.4 MG/DL — SIGNIFICANT CHANGE UP (ref 2.5–7)
UROBILINOGEN FLD QL: 0.2 MG/DL — SIGNIFICANT CHANGE UP (ref 0.2–1)
WBC # BLD: 10.34 K/UL — SIGNIFICANT CHANGE UP (ref 3.8–10.5)
WBC # FLD AUTO: 10.34 K/UL — SIGNIFICANT CHANGE UP (ref 3.8–10.5)
WBC UR QL: 5 /HPF — SIGNIFICANT CHANGE UP (ref 0–5)

## 2025-01-06 PROCEDURE — 93010 ELECTROCARDIOGRAM REPORT: CPT

## 2025-01-06 PROCEDURE — 99222 1ST HOSP IP/OBS MODERATE 55: CPT

## 2025-01-06 RX ORDER — METOCLOPRAMIDE 10 MG/1
10 TABLET ORAL ONCE
Refills: 0 | Status: COMPLETED | OUTPATIENT
Start: 2025-01-06 | End: 2025-01-06

## 2025-01-06 RX ORDER — SODIUM CHLORIDE 9 MG/ML
1000 INJECTION, SOLUTION INTRAVENOUS ONCE
Refills: 0 | Status: COMPLETED | OUTPATIENT
Start: 2025-01-06 | End: 2025-01-06

## 2025-01-06 RX ADMIN — SODIUM CHLORIDE 1000 MILLILITER(S): 9 INJECTION, SOLUTION INTRAVENOUS at 19:43

## 2025-01-06 RX ADMIN — METOCLOPRAMIDE 10 MILLIGRAM(S): 10 TABLET ORAL at 19:43

## 2025-01-06 NOTE — OB PROVIDER TRIAGE NOTE - NSOBPROVIDERNOTE_OBGYN_ALL_OB_FT
30yo G1 presenting at 38.1wga for dizziness, headache, spots in vision. In triage, low SpO2 was noted from 89% and above. Pregnancy c/b polyhydramnios, asthma, psoriasis, and concern for fetal cardiac anomaly (LV small)  PNC: Ekaterina    dizziness, headache, spots in vision  -resolved with Reglan, IVF bolus  -electrolytes wnl  -HELLP labs wnl, UPC 0.2 - do not suspect preE   -EKG with NSR, moderate voltage criteria for LVH vs normal variant - discussed with Cardiology and reports no current needs or outpatient needs if not having HF symptoms    Low SpO2 sats  -asx and exam benign   -declined CXR  -deferred RVP panel due to no sx   -recommend outpatient follow up and taking deep breaths     Given benign findings, ok to dc to home. Plan to follow up with OBGYN in 1 week.   Plan d/w Dr. Ekaterina Garcia MD 30yo G1 presenting at 38.1wga for dizziness, headache, spots in vision. In triage, low SpO2 was noted from 89% and above. Pregnancy c/b polyhydramnios, asthma, psoriasis, and concern for fetal cardiac anomaly (LV small)  PNC: Ekaterina    dizziness, headache, spots in vision  -resolved with Reglan, IVF bolus  -electrolytes wnl  -HELLP labs wnl, UPC 0.2 - do not suspect preE   -EKG with NSR, moderate voltage criteria for LVH vs normal variant - discussed with Cardiology and reports no current needs or outpatient needs if not having HF symptoms    Low SpO2 sats  -asx and exam benign   -declined CXR  -deferred RVP panel due to no sx   -recommend outpatient follow up and taking deep breaths   -incentive spirometer provided    Given benign findings, ok to dc to home. Plan to follow up with OBGYN in 1 week.   Plan d/w Dr. Ekaterina Garcia MD

## 2025-01-06 NOTE — OB RN TRIAGE NOTE - FALL HARM RISK - CONCLUSION
[de-identified] : 3/20/2023: f/u on the right hip/glut, 85-90% better. Intermittent n/t right foot. Start PT, not much oral meds.  Universal Safety Interventions

## 2025-01-06 NOTE — OB PROVIDER TRIAGE NOTE - HISTORY OF PRESENT ILLNESS
30yo G1 presenting at 38.1wga for dizziness, headache, spots in vision. She reported waking up around 1000 today and feeling vertigo symptom, an hour later had a frontal headache and spots in vision. She reports that she notices the symptoms more with standing and is fine laying down. In triage, low SpO2 was noted from 89% and above. She denies fevers, sick contacts, shortness of breath, palpitations. + [place] ctx. -vb/lof. +Good FM.   Pregnancy c/b polyhydramnios, asthma, psoriasis, and concern for fetal cardiac anomaly (LV small)  PNC: Ekaterina    ObHx: G1 current  GynHx: none  PMHx: asthma, psoriasis  PSHx: spine ablation  M: albulterol  A: nkda  SocHx: denies tobacc, ETOH, illicit substances      28yo G1 presenting at 38.1wga for dizziness, headache, spots in vision. She reported waking up around 1000 today and feeling vertigo symptom, an hour later had a frontal headache and spots in vision. She reports that she notices the symptoms more with standing and is fine laying down. In triage, low SpO2 was noted from 89% and above. She denies fevers, sick contacts, shortness of breath, palpitations. + [place] ctx. -vb/lof. +Good FM. Denies being told of snoring episodes   Pregnancy c/b polyhydramnios, asthma, psoriasis, and concern for fetal cardiac anomaly (LV small)  PNC: Ekaterina    ObHx: G1 current  GynHx: none  PMHx: asthma, psoriasis  PSHx: spine ablation  M: albulterol  A: nkda  SocHx: denies tobacc, ETOH, illicit substances

## 2025-01-06 NOTE — OB RN TRIAGE NOTE - CHIEF COMPLAINT QUOTE
dizziness, HA, seeing spots upon waking this morning nausea, dizziness, HA 4/10, seeing spots upon waking this morning; abdominal cramping 2/10

## 2025-01-06 NOTE — OB PROVIDER TRIAGE NOTE - NSHPLABSRESULTS_GEN_ALL_CORE
13.0   10.34 )-----------( 187      ( 2025 19:15 )             39.9   -    132[L]  |  99  |  7   ----------------------------<  81  4.0   |  20[L]  |  0.57    Ca    9.2      2025 19:15  Mg     1.90         TPro  6.9  /  Alb  3.3  /  TBili  0.6  /  DBili  x   /  AST  14  /  ALT  13  /  AlkPhos  103      Urinalysis Basic - ( 2025 19:28 )    Color: Yellow / Appearance: Clear / S.004 / pH: x  Gluc: x / Ketone: Trace mg/dL  / Bili: Negative / Urobili: 0.2 mg/dL   Blood: x / Protein: Negative mg/dL / Nitrite: Negative   Leuk Esterase: Small / RBC: 0 /HPF / WBC 5 /HPF   Sq Epi: x / Non Sq Epi: 2 /HPF / Bacteria: Occasional /HPF    UPC 0.2

## 2025-01-06 NOTE — OB PROVIDER TRIAGE NOTE - NSHPPHYSICALEXAM_GEN_ALL_CORE
Vital Signs Last 24 Hrs  T(C): 36.6 (06 Jan 2025 16:24), Max: 36.6 (06 Jan 2025 13:28)  T(F): 97.88 (06 Jan 2025 16:24), Max: 97.9 (06 Jan 2025 13:28)  HR: 96 (06 Jan 2025 21:45) (69 - 101)  BP: 122/68 (06 Jan 2025 21:43) (103/63 - 123/61)  BP(mean): --  RR: 17 (06 Jan 2025 16:24) (17 - 20)  SpO2: 94% (06 Jan 2025 21:45) (89% - 99%)    Parameters below as of 06 Jan 2025 13:28  Patient On (Oxygen Delivery Method): room air    Gen: NAD  CV: RRR, s1s2 present, no murmurs  Lung: unlabored, LCTAB  Abd: gravid soft NT  Neuro: alert    Efm: 125bpm, moderate variability, +15x15 accels, no decels   toco: uterine irritability, rare ctx    BSUS: vtx, ant placenta, FHTs 133bpm, PAN 29.34cm, BPP 8/8

## 2025-01-06 NOTE — OB RN TRIAGE NOTE - FALL HARM RISK - UNIVERSAL INTERVENTIONS
Pt accepted appointment for tomorrow at 1 pm at the [place] office. Bed in lowest position, wheels locked, appropriate side rails in place/Call bell, personal items and telephone in reach/Instruct patient to call for assistance before getting out of bed or chair/Non-slip footwear when patient is out of bed/[place] to call system/Physically safe environment - no spills, clutter or unnecessary equipment/Purposeful Proactive Rounding/Room/bathroom lighting operational, light cord in reach

## 2025-01-09 DIAGNOSIS — O26.893 OTHER SPECIFIED PREGNANCY RELATED CONDITIONS, THIRD TRIMESTER: ICD-10-CM

## 2025-01-09 DIAGNOSIS — H53.8 OTHER VISUAL DISTURBANCES: ICD-10-CM

## 2025-01-09 DIAGNOSIS — O99.891 OTHER SPECIFIED DISEASES AND CONDITIONS COMPLICATING PREGNANCY: ICD-10-CM

## 2025-01-09 DIAGNOSIS — O99.513 DISEASES OF THE RESPIRATORY SYSTEM COMPLICATING PREGNANCY, THIRD TRIMESTER: ICD-10-CM

## 2025-01-09 DIAGNOSIS — Z3A.38 38 WEEKS GESTATION OF PREGNANCY: ICD-10-CM

## 2025-01-09 DIAGNOSIS — L40.9 PSORIASIS, UNSPECIFIED: ICD-10-CM

## 2025-01-09 DIAGNOSIS — R51.9 HEADACHE, UNSPECIFIED: ICD-10-CM

## 2025-01-09 DIAGNOSIS — J45.909 UNSPECIFIED ASTHMA, UNCOMPLICATED: ICD-10-CM

## 2025-01-09 DIAGNOSIS — R42 DIZZINESS AND GIDDINESS: ICD-10-CM

## 2025-01-09 DIAGNOSIS — O99.713 DISEASES OF THE SKIN AND SUBCUTANEOUS TISSUE COMPLICATING PREGNANCY, THIRD TRIMESTER: ICD-10-CM

## 2025-01-10 ENCOUNTER — APPOINTMENT (OUTPATIENT)
Dept: PEDIATRIC CARDIOLOGY | Facility: CLINIC | Age: 30
End: 2025-01-10
Payer: COMMERCIAL

## 2025-01-10 ENCOUNTER — ASOB RESULT (OUTPATIENT)
Age: 30
End: 2025-01-10

## 2025-01-10 ENCOUNTER — APPOINTMENT (OUTPATIENT)
Dept: ANTEPARTUM | Facility: CLINIC | Age: 30
End: 2025-01-10
Payer: COMMERCIAL

## 2025-01-10 PROCEDURE — 76821 MIDDLE CEREBRAL ARTERY ECHO: CPT

## 2025-01-10 PROCEDURE — 76825 ECHO EXAM OF FETAL HEART: CPT

## 2025-01-10 PROCEDURE — 76820 UMBILICAL ARTERY ECHO: CPT

## 2025-01-10 PROCEDURE — 76818 FETAL BIOPHYS PROFILE W/NST: CPT

## 2025-01-10 PROCEDURE — 99203 OFFICE O/P NEW LOW 30 MIN: CPT | Mod: 25

## 2025-01-10 PROCEDURE — 93325 DOPPLER ECHO COLOR FLOW MAPG: CPT | Mod: 59

## 2025-01-10 PROCEDURE — 76827 ECHO EXAM OF FETAL HEART: CPT

## 2025-01-15 ENCOUNTER — OUTPATIENT (OUTPATIENT)
Dept: OUTPATIENT SERVICES | Facility: HOSPITAL | Age: 30
LOS: 1 days | End: 2025-01-15

## 2025-01-15 VITALS
OXYGEN SATURATION: 100 % | DIASTOLIC BLOOD PRESSURE: 69 MMHG | TEMPERATURE: 97 F | HEART RATE: 91 BPM | SYSTOLIC BLOOD PRESSURE: 101 MMHG | RESPIRATION RATE: 20 BRPM

## 2025-01-15 DIAGNOSIS — Z87.39 PERSONAL HISTORY OF OTHER DISEASES OF THE MUSCULOSKELETAL SYSTEM AND CONNECTIVE TISSUE: Chronic | ICD-10-CM

## 2025-01-15 DIAGNOSIS — O36.63X0 MATERNAL CARE FOR EXCESSIVE FETAL GROWTH, THIRD TRIMESTER, NOT APPLICABLE OR UNSPECIFIED: ICD-10-CM

## 2025-01-15 DIAGNOSIS — O33.5XX2: ICD-10-CM

## 2025-01-15 LAB
APPEARANCE UR: ABNORMAL
BILIRUB UR-MCNC: NEGATIVE — SIGNIFICANT CHANGE UP
BLD GP AB SCN SERPL QL: NEGATIVE — SIGNIFICANT CHANGE UP
COLOR SPEC: YELLOW — SIGNIFICANT CHANGE UP
DIFF PNL FLD: NEGATIVE — SIGNIFICANT CHANGE UP
GLUCOSE UR QL: NEGATIVE MG/DL — SIGNIFICANT CHANGE UP
KETONES UR-MCNC: NEGATIVE MG/DL — SIGNIFICANT CHANGE UP
LEUKOCYTE ESTERASE UR-ACNC: ABNORMAL
NITRITE UR-MCNC: NEGATIVE — SIGNIFICANT CHANGE UP
PH UR: 5.5 — SIGNIFICANT CHANGE UP (ref 5–8)
PROT UR-MCNC: NEGATIVE MG/DL — SIGNIFICANT CHANGE UP
RH IG SCN BLD-IMP: POSITIVE — SIGNIFICANT CHANGE UP
RH IG SCN BLD-IMP: POSITIVE — SIGNIFICANT CHANGE UP
SP GR SPEC: 1.02 — SIGNIFICANT CHANGE UP (ref 1–1.03)
UROBILINOGEN FLD QL: 0.2 MG/DL — SIGNIFICANT CHANGE UP (ref 0.2–1)

## 2025-01-15 RX ORDER — PNV/FERROUS FUM/DOCUSATE/FOLIC 90-50-1MG
1 TABLET, EXTENDED RELEASE ORAL
Refills: 0 | DISCHARGE

## 2025-01-15 RX ORDER — ALBUTEROL SULFATE 90 UG/1
2 INHALANT RESPIRATORY (INHALATION)
Refills: 0 | DISCHARGE

## 2025-01-15 RX ORDER — ANTISEPTIC SURGICAL SCRUB 0.04 MG/ML
1 SOLUTION TOPICAL DAILY
Refills: 0 | Status: DISCONTINUED | OUTPATIENT
Start: 2025-01-19 | End: 2025-01-19

## 2025-01-15 RX ORDER — SODIUM CHLORIDE 9 G/ML
1000 INJECTION, SOLUTION INTRAVENOUS
Refills: 0 | Status: DISCONTINUED | OUTPATIENT
Start: 2025-01-19 | End: 2025-01-19

## 2025-01-15 NOTE — OB PST NOTE - NSHPREVIEWOFSYSTEMS_GEN_ALL_CORE
General: No fever, chills, sweating, anorexia, weight loss or weight gain. No polyphagia, polyurea, polydypsia, malaise, or fatigue    Skin: No rashes, itching, or dryness. No change in size/color of moles. No tumors, brittle nails, pitted nails, or hair loss    Breast: No tenderness, lumps, or nipple discharge      Ophthalmologic: No diplopia, photophobia, lacrimation, blurred Vision , or eye discharge    ENMT Symptoms: No hearing difficulty, ear pain, tinnitus, or vertigo. No sinus symptoms, nasal congestion, nasal   discharge, or nasal obstruction    Respiratory and Thorax: No wheezing, dyspnea, cough, hemoptysis, or pleuritic chest pPain     Cardiovascular: No chest pain, palpitations, dyspnea on exertion, orthopnea, paroxysmal nocturnal dyspnea,   peripheral edema, or claudication    Gastrointestinal: No nausea, vomiting, diarrhea, constipation, change in bowel habits, flatulence, abdominal pain, or melena    Genitourinary/ Pelvis: No hematuria, renal colic, or flank pain.  No urine discoloration, incontinence, dysuria, or urinary hesitancy. Normal urinary frequency. No nocturia, abnormal vaginal bleeding, vaginal discharge, spotting, pelvic pain, or vaginal leakage    Musculoskeletal: No arthralgia, arthritis, joint swelling, muscle cramping, muscle weakness, neck pain, arm pain, or leg pain    Neurological: No transient paralysis, weakness, paresthesias, or seizures. No syncope, tremors, vertigo, loss of sensation, difficulty walking, loss of consciousness, hemiparesis, confusion, or facial palsy    Psychiatric: No suicidal ideation, depression, anxiety, insomnia, memory loss, paranoia, mood swings, agitation, hallucinations, or hyperactivity    Hematology: No gum bleeding, nose bleeding, or skin lumps    Lymphatic: No enlarged or tender lymph nodes. No extremity swelling    Endocrine: No heat or cold intolerance    Immunologic: No recurrent or persistent infections General: No fever, chills, sweating, anorexia, weight loss or weight gain. No polyphagia, polyuria, polydipsia, malaise, or fatigue    Skin: No rashes, itching, or dryness. No change in size/color of moles. No tumors, brittle nails, pitted nails, or hair loss    Breast: No tenderness, lumps, or nipple discharge      Ophthalmologic: No diplopia, photophobia, lacrimation, blurred Vision , or eye discharge    ENMT Symptoms: No hearing difficulty, ear pain, tinnitus, or vertigo. No sinus symptoms, nasal congestion, nasal   discharge, or nasal obstruction    Respiratory and Thorax: No wheezing, dyspnea, cough, hemoptysis, or pleuritic chest pain     Cardiovascular: No chest pain, palpitations, dyspnea on exertion, orthopnea, paroxysmal nocturnal dyspnea,   peripheral edema, or claudication    Gastrointestinal: No nausea, vomiting, diarrhea, constipation, change in bowel habits, flatulence, abdominal pain, or melena    Genitourinary/ Pelvis: No hematuria, renal colic, or flank pain.  No urine discoloration, incontinence, dysuria, or urinary hesitancy. Normal urinary frequency. No nocturia, abnormal vaginal bleeding, vaginal discharge, spotting, pelvic pain, or vaginal leakage    Musculoskeletal: No arthralgia, arthritis, joint swelling, muscle cramping, muscle weakness, neck pain, arm pain, or leg pain    Neurological: No transient paralysis, weakness, paresthesias, or seizures. No syncope, tremors, vertigo, loss of sensation, difficulty walking, loss of consciousness, hemiparesis, confusion, or facial palsy    Psychiatric: No suicidal ideation, depression, anxiety, insomnia, memory loss, paranoia, mood swings, agitation, hallucinations, or hyperactivity    Hematology: No gum bleeding, nose bleeding, or skin lumps    Lymphatic: No enlarged or tender lymph nodes. No extremity swelling    Endocrine: No heat or cold intolerance    Immunologic: No recurrent or persistent infections

## 2025-01-15 NOTE — OB PST NOTE - NSICDXFAMILYHX_GEN_ALL_CORE_FT
FAMILY HISTORY:  Father  Still living? No  FH: heart attack, Age at diagnosis: Age Unknown    Mother  Still living? Yes, Estimated age: Age Unknown  FH: diabetes mellitus, Age at diagnosis: Age Unknown  FH: HTN (hypertension), Age at diagnosis: Age Unknown

## 2025-01-15 NOTE — OB PST NOTE - NSMATERNALFETALCONCERNS_OBGYN_ALL_OB_FT
Fetal Alert  25: Fetal echo on 1/10/25 due to RV/LV size difference, limited arch views, polyhydramnios. Technically difficult study due to poor acoustic window and advanced gestational age. Low normal aortic isthmus measurement on 3 vessel view with intermittent reversal flow. There is expected/usual RV/LV discrepancy. Recommend non-urgent  echocardiogram of the baby prior to discharge from the hospital, or earlier if clinically indicated. The baby can be admitted to nursery, unless clinically warrants higher level of care. I will leave it up to the hospital pediatric cardiology on-call team to determine the need/timing of a follow up with pediatric cardiology  outpatient clinic after the  echocardiogram has been done (and also depending on the baby's clinical status).See echo for full report. Galina Lynch RN.

## 2025-01-15 NOTE — OB PST NOTE - PROBLEM SELECTOR PLAN 1
Scheduled for Primary  section on 25.  Preop instructions provided and patient verbalizes understanding.  Labs done and results pending.  Hibiclens provided with instructions and was signed by patient. Teach-back method was utilized to assess patient's understanding. Patient verbalized understanding.  Patient instructed to use Albuterol on the morning of procedure.

## 2025-01-15 NOTE — OB PST NOTE - HISTORY OF PRESENT ILLNESS
29 yr old  @ 39.3 weeks gestation with medical hx asthma and herniated disc (T8-T11) s/p ablation presents for preop evaluation.  As per patient EFW is ~9+lbs.  Patient reports ANGIE 25, + fetal movement denies vaginal leaking or vaginal bleeding.

## 2025-01-15 NOTE — OB PST NOTE - NSICDXPASTMEDICALHX_GEN_ALL_CORE_FT
PAST MEDICAL HISTORY:  Asthma     Herniated thoracic disc without myelopathy     Obesity     Psoriasis

## 2025-01-16 LAB
BASOPHILS # BLD AUTO: 0.03 K/UL — SIGNIFICANT CHANGE UP (ref 0–0.2)
BASOPHILS NFR BLD AUTO: 0.3 % — SIGNIFICANT CHANGE UP (ref 0–2)
EOSINOPHIL # BLD AUTO: 0.3 K/UL — SIGNIFICANT CHANGE UP (ref 0–0.5)
EOSINOPHIL NFR BLD AUTO: 3.1 % — SIGNIFICANT CHANGE UP (ref 0–6)
HCT VFR BLD CALC: 39.1 % — SIGNIFICANT CHANGE UP (ref 34.5–45)
HGB BLD-MCNC: 12.9 G/DL — SIGNIFICANT CHANGE UP (ref 11.5–15.5)
IMM GRANULOCYTES NFR BLD AUTO: 0.8 % — SIGNIFICANT CHANGE UP (ref 0–0.9)
LYMPHOCYTES # BLD AUTO: 1.91 K/UL — SIGNIFICANT CHANGE UP (ref 1–3.3)
LYMPHOCYTES # BLD AUTO: 19.8 % — SIGNIFICANT CHANGE UP (ref 13–44)
MCHC RBC-ENTMCNC: 28.9 PG — SIGNIFICANT CHANGE UP (ref 27–34)
MCHC RBC-ENTMCNC: 33 G/DL — SIGNIFICANT CHANGE UP (ref 32–36)
MCV RBC AUTO: 87.7 FL — SIGNIFICANT CHANGE UP (ref 80–100)
MONOCYTES # BLD AUTO: 0.53 K/UL — SIGNIFICANT CHANGE UP (ref 0–0.9)
MONOCYTES NFR BLD AUTO: 5.5 % — SIGNIFICANT CHANGE UP (ref 2–14)
NEUTROPHILS # BLD AUTO: 6.8 K/UL — SIGNIFICANT CHANGE UP (ref 1.8–7.4)
NEUTROPHILS NFR BLD AUTO: 70.5 % — SIGNIFICANT CHANGE UP (ref 43–77)
PLATELET # BLD AUTO: 199 K/UL — SIGNIFICANT CHANGE UP (ref 150–400)
RBC # BLD: 4.46 M/UL — SIGNIFICANT CHANGE UP (ref 3.8–5.2)
RBC # FLD: 13.8 % — SIGNIFICANT CHANGE UP (ref 10.3–14.5)
WBC # BLD: 9.65 K/UL — SIGNIFICANT CHANGE UP (ref 3.8–10.5)
WBC # FLD AUTO: 9.65 K/UL — SIGNIFICANT CHANGE UP (ref 3.8–10.5)

## 2025-01-17 ENCOUNTER — ASOB RESULT (OUTPATIENT)
Age: 30
End: 2025-01-17

## 2025-01-17 ENCOUNTER — APPOINTMENT (OUTPATIENT)
Dept: ANTEPARTUM | Facility: CLINIC | Age: 30
End: 2025-01-17
Payer: COMMERCIAL

## 2025-01-17 ENCOUNTER — TRANSCRIPTION ENCOUNTER (OUTPATIENT)
Age: 30
End: 2025-01-17

## 2025-01-17 PROCEDURE — 76818 FETAL BIOPHYS PROFILE W/NST: CPT

## 2025-01-19 ENCOUNTER — TRANSCRIPTION ENCOUNTER (OUTPATIENT)
Age: 30
End: 2025-01-19

## 2025-01-19 ENCOUNTER — INPATIENT (INPATIENT)
Facility: HOSPITAL | Age: 30
LOS: 1 days | Discharge: HOME | End: 2025-01-21
Attending: SPECIALIST | Admitting: SPECIALIST

## 2025-01-19 VITALS — SYSTOLIC BLOOD PRESSURE: 111 MMHG | HEART RATE: 117 BPM | DIASTOLIC BLOOD PRESSURE: 80 MMHG | TEMPERATURE: 98 F

## 2025-01-19 DIAGNOSIS — O33.5XX2: ICD-10-CM

## 2025-01-19 DIAGNOSIS — Z87.39 PERSONAL HISTORY OF OTHER DISEASES OF THE MUSCULOSKELETAL SYSTEM AND CONNECTIVE TISSUE: Chronic | ICD-10-CM

## 2025-01-19 LAB
BASOPHILS # BLD AUTO: 0.04 K/UL — SIGNIFICANT CHANGE UP (ref 0–0.2)
BASOPHILS NFR BLD AUTO: 0.4 % — SIGNIFICANT CHANGE UP (ref 0–2)
BLD GP AB SCN SERPL QL: NEGATIVE — SIGNIFICANT CHANGE UP
EOSINOPHIL # BLD AUTO: 0.28 K/UL — SIGNIFICANT CHANGE UP (ref 0–0.5)
EOSINOPHIL NFR BLD AUTO: 2.6 % — SIGNIFICANT CHANGE UP (ref 0–6)
HCT VFR BLD CALC: 40.3 % — SIGNIFICANT CHANGE UP (ref 34.5–45)
HGB BLD-MCNC: 13.4 G/DL — SIGNIFICANT CHANGE UP (ref 11.5–15.5)
IANC: 7.18 K/UL — SIGNIFICANT CHANGE UP (ref 1.8–7.4)
IMM GRANULOCYTES NFR BLD AUTO: 0.7 % — SIGNIFICANT CHANGE UP (ref 0–0.9)
LYMPHOCYTES # BLD AUTO: 2.49 K/UL — SIGNIFICANT CHANGE UP (ref 1–3.3)
LYMPHOCYTES # BLD AUTO: 23 % — SIGNIFICANT CHANGE UP (ref 13–44)
MCHC RBC-ENTMCNC: 28.4 PG — SIGNIFICANT CHANGE UP (ref 27–34)
MCHC RBC-ENTMCNC: 33.3 G/DL — SIGNIFICANT CHANGE UP (ref 32–36)
MCV RBC AUTO: 85.4 FL — SIGNIFICANT CHANGE UP (ref 80–100)
MONOCYTES # BLD AUTO: 0.74 K/UL — SIGNIFICANT CHANGE UP (ref 0–0.9)
MONOCYTES NFR BLD AUTO: 6.8 % — SIGNIFICANT CHANGE UP (ref 2–14)
NEUTROPHILS # BLD AUTO: 7.18 K/UL — SIGNIFICANT CHANGE UP (ref 1.8–7.4)
NEUTROPHILS NFR BLD AUTO: 66.5 % — SIGNIFICANT CHANGE UP (ref 43–77)
NRBC # BLD AUTO: 0 K/UL — SIGNIFICANT CHANGE UP (ref 0–0)
NRBC # BLD: 0 /100 WBCS — SIGNIFICANT CHANGE UP (ref 0–0)
NRBC # FLD: 0 K/UL — SIGNIFICANT CHANGE UP (ref 0–0)
NRBC BLD-RTO: 0 /100 WBCS — SIGNIFICANT CHANGE UP (ref 0–0)
PLATELET # BLD AUTO: 200 K/UL — SIGNIFICANT CHANGE UP (ref 150–400)
RBC # BLD: 4.72 M/UL — SIGNIFICANT CHANGE UP (ref 3.8–5.2)
RBC # FLD: 13.7 % — SIGNIFICANT CHANGE UP (ref 10.3–14.5)
RH IG SCN BLD-IMP: POSITIVE — SIGNIFICANT CHANGE UP
WBC # BLD: 10.81 K/UL — HIGH (ref 3.8–10.5)
WBC # FLD AUTO: 10.81 K/UL — HIGH (ref 3.8–10.5)

## 2025-01-19 RX ORDER — ACETAMINOPHEN 160 MG/5ML
975 SUSPENSION ORAL
Refills: 0 | Status: DISCONTINUED | OUTPATIENT
Start: 2025-01-19 | End: 2025-01-21

## 2025-01-19 RX ORDER — ACETAMINOPHEN 160 MG/5ML
1000 SUSPENSION ORAL ONCE
Refills: 0 | Status: COMPLETED | OUTPATIENT
Start: 2025-01-19 | End: 2025-01-19

## 2025-01-19 RX ORDER — SODIUM CHLORIDE 9 G/ML
1000 INJECTION, SOLUTION INTRAVENOUS
Refills: 0 | Status: DISCONTINUED | OUTPATIENT
Start: 2025-01-19 | End: 2025-01-19

## 2025-01-19 RX ORDER — OXYCODONE HYDROCHLORIDE 30 MG/1
5 TABLET ORAL
Refills: 0 | Status: DISCONTINUED | OUTPATIENT
Start: 2025-01-19 | End: 2025-01-21

## 2025-01-19 RX ORDER — OXYTOCIN/0.9 % SODIUM CHLORIDE 10/500ML
42 PLASTIC BAG, INJECTION (ML) INTRAVENOUS
Qty: 30 | Refills: 0 | Status: DISCONTINUED | OUTPATIENT
Start: 2025-01-19 | End: 2025-01-19

## 2025-01-19 RX ORDER — SODIUM CITRATE AND CITRIC ACID MONOHYDRATE 1002; 1500 MG/15ML; MG/15ML
30 SOLUTION ORAL ONCE
Refills: 0 | Status: COMPLETED | OUTPATIENT
Start: 2025-01-19 | End: 2025-01-19

## 2025-01-19 RX ORDER — KETOROLAC TROMETHAMINE 10 MG
30 TABLET ORAL EVERY 6 HOURS
Refills: 0 | Status: DISCONTINUED | OUTPATIENT
Start: 2025-01-19 | End: 2025-01-20

## 2025-01-19 RX ORDER — FAMOTIDINE 10 MG/ML
20 INJECTION INTRAVENOUS ONCE
Refills: 0 | Status: COMPLETED | OUTPATIENT
Start: 2025-01-19 | End: 2025-01-19

## 2025-01-19 RX ORDER — DIPHENHYDRAMINE HCL 25 MG
25 CAPSULE ORAL EVERY 6 HOURS
Refills: 0 | Status: DISCONTINUED | OUTPATIENT
Start: 2025-01-19 | End: 2025-01-21

## 2025-01-19 RX ORDER — HEPARIN SODIUM,PORCINE 10000/ML
10000 VIAL (ML) INJECTION EVERY 12 HOURS
Refills: 0 | Status: DISCONTINUED | OUTPATIENT
Start: 2025-01-19 | End: 2025-01-21

## 2025-01-19 RX ORDER — SODIUM CHLORIDE 9 G/ML
500 INJECTION, SOLUTION INTRAVENOUS ONCE
Refills: 0 | Status: COMPLETED | OUTPATIENT
Start: 2025-01-19 | End: 2025-01-19

## 2025-01-19 RX ORDER — CLOSTRIDIUM TETANI TOXOID ANTIGEN (FORMALDEHYDE INACTIVATED), CORYNEBACTERIUM DIPHTHERIAE TOXOID ANTIGEN (FORMALDEHYDE INACTIVATED), BORDETELLA PERTUSSIS TOXOID ANTIGEN (GLUTARALDEHYDE INACTIVATED), BORDETELLA PERTUSSIS FILAMENTOUS HEMAGGLUTININ ANTIGEN (FORMALDEHYDE INACTIVATED), BORDETELLA PERTUSSIS PERTACTIN ANTIGEN, AND BORDETELLA PERTUSSIS FIMBRIAE 2/3 ANTIGEN 5; 2; 2.5; 5; 3; 5 [LF]/.5ML; [LF]/.5ML; UG/.5ML; UG/.5ML; UG/.5ML; UG/.5ML
0.5 INJECTION, SUSPENSION INTRAMUSCULAR ONCE
Refills: 0 | Status: COMPLETED | OUTPATIENT
Start: 2025-01-19

## 2025-01-19 RX ORDER — HEPARIN SODIUM,PORCINE 10000/ML
5000 VIAL (ML) INJECTION EVERY 12 HOURS
Refills: 0 | Status: DISCONTINUED | OUTPATIENT
Start: 2025-01-19 | End: 2025-01-19

## 2025-01-19 RX ORDER — MAGNESIUM HYDROXIDE 400 MG/5ML
30 SUSPENSION, ORAL (FINAL DOSE FORM) ORAL
Refills: 0 | Status: DISCONTINUED | OUTPATIENT
Start: 2025-01-19 | End: 2025-01-21

## 2025-01-19 RX ORDER — IBUPROFEN 600 MG/1
600 TABLET, FILM COATED ORAL EVERY 6 HOURS
Refills: 0 | Status: COMPLETED | OUTPATIENT
Start: 2025-01-19 | End: 2025-12-18

## 2025-01-19 RX ORDER — OXYCODONE HYDROCHLORIDE 30 MG/1
5 TABLET ORAL ONCE
Refills: 0 | Status: DISCONTINUED | OUTPATIENT
Start: 2025-01-19 | End: 2025-01-21

## 2025-01-19 RX ORDER — ALBUTEROL 90 MCG
2 AEROSOL REFILL (GRAM) INHALATION EVERY 6 HOURS
Refills: 0 | Status: DISCONTINUED | OUTPATIENT
Start: 2025-01-19 | End: 2025-01-19

## 2025-01-19 RX ADMIN — ACETAMINOPHEN 1000 MILLIGRAM(S): 160 SUSPENSION ORAL at 12:39

## 2025-01-19 RX ADMIN — SODIUM CHLORIDE 1000 MILLILITER(S): 9 INJECTION, SOLUTION INTRAVENOUS at 13:12

## 2025-01-19 RX ADMIN — Medication 42 MILLIUNIT(S)/MIN: at 12:06

## 2025-01-19 RX ADMIN — SODIUM CHLORIDE 200 MILLILITER(S): 9 INJECTION, SOLUTION INTRAVENOUS at 07:16

## 2025-01-19 RX ADMIN — Medication 10000 UNIT(S): at 21:51

## 2025-01-19 RX ADMIN — SODIUM CHLORIDE 1000 MILLILITER(S): 9 INJECTION, SOLUTION INTRAVENOUS at 12:05

## 2025-01-19 RX ADMIN — FAMOTIDINE 20 MILLIGRAM(S): 10 INJECTION INTRAVENOUS at 07:26

## 2025-01-19 RX ADMIN — Medication 30 MILLIGRAM(S): at 18:15

## 2025-01-19 RX ADMIN — ACETAMINOPHEN 975 MILLIGRAM(S): 160 SUSPENSION ORAL at 21:06

## 2025-01-19 RX ADMIN — SODIUM CHLORIDE 83 MILLILITER(S): 9 INJECTION, SOLUTION INTRAVENOUS at 12:06

## 2025-01-19 RX ADMIN — Medication 30 MILLIGRAM(S): at 17:45

## 2025-01-19 RX ADMIN — ANTISEPTIC SURGICAL SCRUB 1 APPLICATION(S): 0.04 SOLUTION TOPICAL at 07:16

## 2025-01-19 RX ADMIN — SODIUM CITRATE AND CITRIC ACID MONOHYDRATE 30 MILLILITER(S): 1002; 1500 SOLUTION ORAL at 07:16

## 2025-01-19 RX ADMIN — ACETAMINOPHEN 975 MILLIGRAM(S): 160 SUSPENSION ORAL at 21:40

## 2025-01-19 RX ADMIN — ACETAMINOPHEN 400 MILLIGRAM(S): 160 SUSPENSION ORAL at 12:05

## 2025-01-19 NOTE — OB PROVIDER DELIVERY SUMMARY - NSLOWPPHRISK_OBGYN_A_OB
No previous uterine incision/[place] Pregnancy/Less than or equal to 4 previous vaginal births/No known bleeding disorder/No history of postpartum hemorrhage

## 2025-01-19 NOTE — OB PROVIDER DELIVERY SUMMARY - NSATTENDATTESTATION_OBGYN_A_OB
Has The Growth Been Previously Biopsied?: has been previously biopsied Body Location Override (Optional): Mid inferior posterior crown I was physically present and participated in this delivery.

## 2025-01-19 NOTE — OB RN INTRAOPERATIVE NOTE - NSSELHIDDEN_OBGYN_ALL_OB_FT
[NS_DeliveryAttending1_OBGYN_ALL_OB_FT:[id number]],[NS_DeliveryAssist1_OBGYN_ALL_OB_FT:[id number]],[NS_DeliveryRN_OBGYN_ALL_OB_FT:[id number]=]

## 2025-01-19 NOTE — OB RN DELIVERY SUMMARY - NSSELHIDDEN_OBGYN_ALL_OB_FT
[NS_DeliveryAttending1_OBGYN_ALL_OB_FT:[id number]],[NS_DeliveryAssist1_OBGYN_ALL_OB_FT:[id number]],[NS_DeliveryRN_OBGYN_ALL_OB_FT:[id number]=]

## 2025-01-19 NOTE — PROVIDER CONTACT NOTE (CHANGE IN STATUS NOTIFICATION) - ASSESSMENT
patient 2nd hour urine output is 27cc, she is required to put out 56.5cc of urine per hour patient 2nd hour urine output is 27cc, she is required to put out 56.5cc of urine per hour    v/s WNL, uterus firm with light bleeding patient 2nd hour urine output is 27cc, she is required to put out 56.5cc of urine per hour    v/s WNL, uterus firm at midline with light bleeding

## 2025-01-19 NOTE — OB PROVIDER DELIVERY SUMMARY - NSSELHIDDEN_OBGYN_ALL_OB_FT
[NS_DeliveryAttending1_OBGYN_ALL_OB_FT:[id number]],[NS_DeliveryAssist1_OBGYN_ALL_OB_FT:[id number]],[NS_DeliveryRN_OBGYN_ALL_OB_FT:[id number]=]

## 2025-01-19 NOTE — OB PROVIDER DELIVERY SUMMARY - NSPROVIDERDELIVERYNOTE_OBGYN_ALL_OB_FT
Scheduled primary elective LTCS in the setting of suspected fetal macrosomia, uncomplicated. Viable male infant, vertex presentation, Apgars 9/9, birth weight 4090g (9lb 0oz) cord gasses sent. Grossly normal fallopian tubes, uterus, and ovaries. Uterus closed in 1 layer with PDS. Fascia closed with 0-Vicryl suture. Subcutaneous tissue reapproximated in 3 layers with 2-0 Vicryl. Skin closed with monocryl suture with overlying Dermabond prineo.    QBL: 245   IVF: 2500  UOP: 350    Dictation #    Roxie Zhou, PGY3  w/ attending, Dr. Tobar Scheduled primary elective LTCS in the setting of suspected fetal macrosomia, uncomplicated. Viable male infant, vertex presentation, Apgars 9/9, birth weight 4090g (9lb 0oz) cord gasses sent. Grossly normal fallopian tubes, uterus, and ovaries. Uterus closed in 1 layer with PDS. Fascia closed with 0-Vicryl suture. Subcutaneous tissue reapproximated in 3 layers with 2-0 Vicryl. Skin closed with monocryl suture with overlying Dermabond prineo.    QBL: 245   IVF: 2500  UOP: 350    Dictation #[id number]    Roxie Zhou, PGY3  w/ attending, Dr. Tobar Scheduled primary elective LTCS in the setting of suspected fetal macrosomia, uncomplicated. Viable male infant, vertex presentation, Apgars 9/9, birth weight 4090g (9lb 0oz) cord gasses sent. Grossly normal fallopian tubes, uterus, and ovaries. Uterus closed in 1 layer with Caprosyn. Fascia closed with 0-Vicryl suture. Subcutaneous tissue reapproximated in 3 layers with 2-0 Vicryl. Skin closed with monocryl suture with overlying Dermabond prineo.    QBL: 245   IVF: 2500  UOP: 350    Dictation #[id number]    Roxie Zhou, PGY3  w/ attending, Dr. Tobar

## 2025-01-19 NOTE — OB PROVIDER H&P - HISTORY OF PRESENT ILLNESS
29 yr old  @40w presenting for scheduled primary elective  section. Patient endorses good fetal movement, denies loss of fluid, denies contractions, denies vaginal bleeding.      Prenatal Issues:   LIST  antepartum admissions  GBS  Sono anomolies, genetic testing, infections, IVF?  EFW    ObHx: G1 current  GynHx: none  PMHx: asthma, psoriasis  PSHx: spine ablation  M: albulterol  A: nkda  SocHx: denies tobacc, ETOH, illicit substances     Vital Signs Last 24 Hrs  T(C): --  T(F): --  HR: 117 (2025 07:07) (117 - 117)  BP: 111/80 (2025 07:07) (111/80 - 111/80)  BP(mean): --  RR: --  SpO2: --        Physical Exam:  Gen: NAD, AOx3  CV: RR  Resp: unlabored respirations  Abd: soft, NT, ND    Speculum Exam:   - pooling, nitrazine, ferning, bleeding   - (lesions if pt has history of HSV)    SVE:  FHT:  [place]:  EFW: Sono/leopolds  Sono: fetal presentation, placentation  BPP:     A/P: Pt is a _yo G_P_ who presents [active labor/SROM/induction of labor].  - prenatal issues, GBS status    1. Admit to LND. Routine Labs. IVF  2. Expectant Management vs. IOL  3. Fetus: Cat X tracing, Vertex, EFW _ . C/w EFM.  4+ List all prenatal issues w/ a plan (PEC, GDM, TOLAC, Asthma, etc)  5. GBS status + plan  6. Pain: IV pain meds/epidural PRN              weeks gestation with medical hx asthma and herniated disc (T8-T11) s/p ablation presents for preop evaluation.  As per patient EFW is ~9+lbs.  Patient reports ANGIE 25, + fetal movement denies vaginal leaking or vaginal bleeding. 29 yr old  @40w presenting for scheduled primary  section due to suspected macrosomia. Patient endorses good fetal movement, denies loss of fluid, denies contractions, denies vaginal bleeding.      Prenatal Issues:   - Intermittent polyhydramnios PAN 24 on last US ()  - FA for RV/LV size difference, repeat fetal echo 1/10 normal variant vs slight size difference. Plan for post  evaluation.   GBS: negative   EFW: 3900g (extrapolated from growth 1/3), consistent with growth done per pt on Tuesday.    ObHx: G1 current  GynHx: none  PMHx: asthma (no hospitalizations/intubations), psoriasis, disc herniation (T8-T11) s/p spine ablation   PSHx: spine ablation   M: albulterol PRN - last used a few weeks ago   A: nkda  SocHx: denies tobacc, ETOH, illicit substances     Vital Signs Last 24 Hrs  T(C): --  T(F): --  HR: 117 (2025 07:07) (117 - 117)  BP: 111/80 (2025 07:07) (111/80 - 111/80)  BP(mean): --  RR: --  SpO2: --        Physical Exam:  Gen: NAD, AOx3  CV: RR  Resp: unlabored respirations  Abd: soft, NT, ND    FHT: 140, moderate, +accel, no decel   [place]: quiescent                     weeks gestation with medical hx asthma and herniated disc (T8-T11) s/p ablation presents for preop evaluation.  As per patient EFW is ~9+lbs.  Patient reports ANGIE 25, + fetal movement denies vaginal leaking or vaginal bleeding.

## 2025-01-19 NOTE — OB RN INTRAOPERATIVE NOTE - NS_INTPNECOMPLACE_OBGYN_ALL_OB
Regarding: Pt took last antibiotic and concern with coughing   ----- Message from Kolby Cordoba sent at 11/5/2019  5:05 PM CST -----  Patient Name: Dinora Blackmon  Specialist or PCP Full Name: Dr. Laura Arroyo  Pregnant (If Yes, how long?): no  Symptoms: Pt took last antibiotic and concern with coughing    Call Back #: [phone]  Is the patient’s permanent residence located in [place], [place], or a [facility]? Yes [place] [id number]  Call Center Account #: [id number]         Bilateral legs

## 2025-01-19 NOTE — OB PROVIDER H&P - ASSESSMENT
A/P: 29 yr old  @40w presenting for scheduled primary  section due to suspected macrosomia.     1. Admit to LND. Routine Labs. IVF  2. For scheduled  section   3. Fetus: Cat I tracing  4. FA as above, for post rei follow up   5. GBS negative   6. Anesthesia consult    Twyla Kaminski, PGY4

## 2025-01-19 NOTE — OB PROVIDER H&P - NSMATERNALFETALCONCERNS_OBGYN_ALL_OB_FT
Fetal Alert  25: Fetal echo on 1/10/25 due to RV/LV size difference, limited arch views, polyhydramnios.Technically difficult study due to poor acoustic window and advanced gestational age.Low normal aortic isthmus measurement on 3 vessel view with intermittent reversal flow.There is expected/usual RV/LV discrepancy.Recommend non-urgent  echocardiogram of the baby prior to discharge from the hospital, or earlier if clinically indicated. The baby can be admitted to nursery, unless clinically warrants higher level of care. I will leave it up to the hospital pediatric cardiology oncall team to determine the need/timing of a follow up with pediatric cardiology  outpatient clinic after the  echocardiogram has been done (and also depending on the baby's clinical status).See echo for full report. Galina Lynch RN.

## 2025-01-19 NOTE — OB RN DELIVERY SUMMARY - NS_SEPSISRSKCALC_OBGYN_ALL_OB_FT
EOS calculated successfully. EOS Risk Factor: 0.5/1000 live births ([facility] national incidence); GA=40w;Temp=97.7; ROM=0.017; GBS='Negative'; Antibiotics='No antibiotics or any antibiotics < 2 hrs prior to birth'

## 2025-01-19 NOTE — OB PROVIDER DELIVERY SUMMARY - NSCSDELIVASCHE_OBGYN_ALL_OB
CATARACTS OS&gt;OD: PT WOULD PREFER TO SEE DR. Jewell Diaz.
CHOROIDAL NEVUS, OS: PRESCRIBED UV PROTECTION TO MINIMIZE UV EXPOSURE. RETURN FOR FOLLOW-UP AS SCHEDULED.
DRY EYE SYNDROME OU: RX ARTIFICIAL TEARS AS NEEDED TO INCREASE COMFORT OU. IF SYMPTOMS PERSIST CONSIDER PUNCTAL PLUGS OR RESTASIS.
General:
OPTIC DISC DRUSEN OU: STABLE, FOLLOW FOR CHANGES.
Scheduled

## 2025-01-19 NOTE — OB RN INTRAOPERATIVE NOTE - NS_ELECTROCAUTCUT_OBGYN_ALL_OB_FT
38
Father  Still living? No  Family history of multiple myeloma, Age at diagnosis: Age Unknown     Mother  Still living? No  Family history of pancreatic cancer, Age at diagnosis: Age Unknown

## 2025-01-20 ENCOUNTER — TRANSCRIPTION ENCOUNTER (OUTPATIENT)
Age: 30
End: 2025-01-20

## 2025-01-20 LAB
BASOPHILS # BLD AUTO: 0.05 K/UL — SIGNIFICANT CHANGE UP (ref 0–0.2)
BASOPHILS NFR BLD AUTO: 0.5 % — SIGNIFICANT CHANGE UP (ref 0–2)
EOSINOPHIL # BLD AUTO: 0.24 K/UL — SIGNIFICANT CHANGE UP (ref 0–0.5)
EOSINOPHIL NFR BLD AUTO: 2.3 % — SIGNIFICANT CHANGE UP (ref 0–6)
HCT VFR BLD CALC: 32.5 % — LOW (ref 34.5–45)
HCV AB S/CO SERPL IA: 0.1 S/CO — SIGNIFICANT CHANGE UP (ref 0–0.99)
HCV AB SERPL-IMP: SIGNIFICANT CHANGE UP
HGB BLD-MCNC: 10.8 G/DL — LOW (ref 11.5–15.5)
IANC: 6.93 K/UL — SIGNIFICANT CHANGE UP (ref 1.8–7.4)
IMM GRANULOCYTES NFR BLD AUTO: 0.6 % — SIGNIFICANT CHANGE UP (ref 0–0.9)
LYMPHOCYTES # BLD AUTO: 2.5 K/UL — SIGNIFICANT CHANGE UP (ref 1–3.3)
LYMPHOCYTES # BLD AUTO: 23.9 % — SIGNIFICANT CHANGE UP (ref 13–44)
MCHC RBC-ENTMCNC: 28.6 PG — SIGNIFICANT CHANGE UP (ref 27–34)
MCHC RBC-ENTMCNC: 33.2 G/DL — SIGNIFICANT CHANGE UP (ref 32–36)
MCV RBC AUTO: 86 FL — SIGNIFICANT CHANGE UP (ref 80–100)
MONOCYTES # BLD AUTO: 0.68 K/UL — SIGNIFICANT CHANGE UP (ref 0–0.9)
MONOCYTES NFR BLD AUTO: 6.5 % — SIGNIFICANT CHANGE UP (ref 2–14)
NEUTROPHILS # BLD AUTO: 6.93 K/UL — SIGNIFICANT CHANGE UP (ref 1.8–7.4)
NEUTROPHILS NFR BLD AUTO: 66.2 % — SIGNIFICANT CHANGE UP (ref 43–77)
NRBC # BLD AUTO: 0 K/UL — SIGNIFICANT CHANGE UP (ref 0–0)
NRBC # BLD: 0 /100 WBCS — SIGNIFICANT CHANGE UP (ref 0–0)
NRBC # FLD: 0 K/UL — SIGNIFICANT CHANGE UP (ref 0–0)
NRBC BLD-RTO: 0 /100 WBCS — SIGNIFICANT CHANGE UP (ref 0–0)
PLATELET # BLD AUTO: 148 K/UL — LOW (ref 150–400)
RBC # BLD: 3.78 M/UL — LOW (ref 3.8–5.2)
RBC # FLD: 13.8 % — SIGNIFICANT CHANGE UP (ref 10.3–14.5)
WBC # BLD: 10.46 K/UL — SIGNIFICANT CHANGE UP (ref 3.8–10.5)
WBC # FLD AUTO: 10.46 K/UL — SIGNIFICANT CHANGE UP (ref 3.8–10.5)

## 2025-01-20 RX ORDER — ACETAMINOPHEN 160 MG/5ML
3 SUSPENSION ORAL
Qty: 0 | Refills: 0 | DISCHARGE
Start: 2025-01-20

## 2025-01-20 RX ORDER — IBUPROFEN 600 MG/1
1 TABLET, FILM COATED ORAL
Qty: 0 | Refills: 0 | DISCHARGE
Start: 2025-01-20

## 2025-01-20 RX ORDER — IBUPROFEN 600 MG/1
600 TABLET, FILM COATED ORAL EVERY 6 HOURS
Refills: 0 | Status: DISCONTINUED | OUTPATIENT
Start: 2025-01-20 | End: 2025-01-21

## 2025-01-20 RX ADMIN — ACETAMINOPHEN 975 MILLIGRAM(S): 160 SUSPENSION ORAL at 02:25

## 2025-01-20 RX ADMIN — ACETAMINOPHEN 975 MILLIGRAM(S): 160 SUSPENSION ORAL at 03:00

## 2025-01-20 RX ADMIN — ACETAMINOPHEN 975 MILLIGRAM(S): 160 SUSPENSION ORAL at 09:50

## 2025-01-20 RX ADMIN — Medication 30 MILLIGRAM(S): at 00:14

## 2025-01-20 RX ADMIN — Medication 30 MILLIGRAM(S): at 05:42

## 2025-01-20 RX ADMIN — IBUPROFEN 600 MILLIGRAM(S): 600 TABLET, FILM COATED ORAL at 13:15

## 2025-01-20 RX ADMIN — IBUPROFEN 600 MILLIGRAM(S): 600 TABLET, FILM COATED ORAL at 18:01

## 2025-01-20 RX ADMIN — ACETAMINOPHEN 975 MILLIGRAM(S): 160 SUSPENSION ORAL at 20:56

## 2025-01-20 RX ADMIN — Medication 10000 UNIT(S): at 12:09

## 2025-01-20 RX ADMIN — Medication 30 MILLIGRAM(S): at 06:13

## 2025-01-20 RX ADMIN — ACETAMINOPHEN 975 MILLIGRAM(S): 160 SUSPENSION ORAL at 14:53

## 2025-01-20 RX ADMIN — ACETAMINOPHEN 975 MILLIGRAM(S): 160 SUSPENSION ORAL at 15:43

## 2025-01-20 RX ADMIN — IBUPROFEN 600 MILLIGRAM(S): 600 TABLET, FILM COATED ORAL at 23:08

## 2025-01-20 RX ADMIN — ACETAMINOPHEN 975 MILLIGRAM(S): 160 SUSPENSION ORAL at 21:30

## 2025-01-20 RX ADMIN — IBUPROFEN 600 MILLIGRAM(S): 600 TABLET, FILM COATED ORAL at 23:40

## 2025-01-20 RX ADMIN — Medication 30 MILLIGRAM(S): at 00:44

## 2025-01-20 RX ADMIN — IBUPROFEN 600 MILLIGRAM(S): 600 TABLET, FILM COATED ORAL at 12:16

## 2025-01-20 RX ADMIN — ACETAMINOPHEN 975 MILLIGRAM(S): 160 SUSPENSION ORAL at 08:50

## 2025-01-20 NOTE — DISCHARGE NOTE OB - PROVIDER TOKENS
CC: Cauda Equina    Subjective and objective: ***************************  Patient seen and evaluated during therapy this AM with Dr. Herring.  Admits to poor sleep as he was worried about surgical site swelling overnight.  Emotional during encounter.   Discussed CT imaging findings and collaboration with NSGY.  Reassured nothing to do at this time, and he will follow up outpatient.   Last BM on 2/12, per chart review. Pt denies any bowel issues.   No other complaints at this time.     Denies HA, dizziness, visual changes, CP, SOB, cough, nausea, abd pain, or urinary symptoms.      Therapy--observed ambulating with walker, ace wrap to right foot, > 100 ft, CGA  R knee buckling this morning with OT.      Interval discomfort over lower back--surgical area,  Patient reports intense personal exercise, stretching of LE and back a day prior  No acute back pain  CT Lumbar spine showed possible seroma/leak  Neurosurgery phone consult by night team recommended observation and possible out patient fluid tap    No signs of infection    Will get neurology review of surgical area and CT findings  Will inform operating surgeon afterwards--CT findings and neuro recs  Monitor for signs of infection    Vital Signs Last 24 Hrs  T(C): 36.7 (14 Feb 2023 20:53), Max: 36.7 (14 Feb 2023 20:53)  T(F): 98 (14 Feb 2023 20:53), Max: 98 (14 Feb 2023 20:53)  HR: 79 (15 Feb 2023 06:49) (73 - 79)  BP: 119/77 (15 Feb 2023 06:49) (110/77 - 144/79)  BP(mean): --  RR: 16 (14 Feb 2023 20:53) (16 - 16)  SpO2: 100% (14 Feb 2023 20:53) (100% - 100%)      PHYSICAL EXAM:  Constitutional -Comfortable  HEENT - NAD  Neck - Supple, No limited ROM  Pulm - resp nonlabored  Cardio - warm and well perfused   Abdomen -  Soft, NTND  Extremities - No peripheral edema, No calf tenderness     Neurologic Exam:                 Cognition - AOx4  Motor -                      LEFT    UE - ShAB 5/5, EF 5/5, EE 5/5, WE 5/5,  WNL                    RIGHT UE - ShAB 5/5, EF 5/5, EE 5/5, WE 5/5,  WNL                    LEFT    LE - HF 2/5, KE 3+/5, DF 1/5, PF 4/5 Strong hip extensors4+/5                    RIGHT LE - HF 2/5, KE 2+/5, DF 0/5, PF 4/5     Sensory - decrease sensation below the knee. R>L  Psychiatric - Mood stable, Affect WNL  Skin lumbar incision intact - healed, mild edema noted. No erythema or drainage or fluctuance            LAB                        13.0   7.16  )-----------( 238      ( 15 Feb 2023 06:12 )             39.4     02-15    141  |  102  |  22  ----------------------------<  103<H>  4.2   |  31  |  0.61    Ca    9.5      15 Feb 2023 06:12    TPro  7.2  /  Alb  3.6  /  TBili  0.3  /  DBili  x   /  AST  35  /  ALT  70<H>  /  AlkPhos  66  02-15    LIVER FUNCTIONS - ( 15 Feb 2023 06:12 )  Alb: 3.6 g/dL / Pro: 7.2 g/dL / ALK PHOS: 66 U/L / ALT: 70 U/L / AST: 35 U/L / GGT: x             MEDICATIONS  (STANDING):  acetaminophen     Tablet .. 975 milliGRAM(s) Oral every 6 hours  aspirin enteric coated 81 milliGRAM(s) Oral daily  carvedilol 12.5 milliGRAM(s) Oral every 12 hours  enoxaparin Injectable 40 milliGRAM(s) SubCutaneous <User Schedule>  gabapentin 200 milliGRAM(s) Oral three times a day  lidocaine   4% Patch 1 Patch Transdermal daily  lidocaine   4% Patch 1 Patch Transdermal every 24 hours  losartan 100 milliGRAM(s) Oral daily  melatonin 6 milliGRAM(s) Oral at bedtime  methocarbamol 1500 milliGRAM(s) Oral three times a day  multivitamin 1 Tablet(s) Oral daily  polyethylene glycol 3350 17 Gram(s) Oral two times a day  senna 2 Tablet(s) Oral at bedtime  simvastatin 40 milliGRAM(s) Oral at bedtime  tamsulosin 0.4 milliGRAM(s) Oral at bedtime    MEDICATIONS  (PRN):  lidocaine 2% Jelly 3 milliLiter(s) IntraUrethral every 6 hours PRN For straight catheterization  magnesium hydroxide Suspension 30 milliLiter(s) Oral every 12 hours PRN Constipation  oxyCODONE    IR 5 milliGRAM(s) Oral every 4 hours PRN Moderate Pain (4 - 6)  oxyCODONE    IR 10 milliGRAM(s) Oral every 4 hours PRN Severe Pain (7 - 10)   CC: Cauda Equina    Subjective and objective:   Patient seen and evaluated during therapy this AM with Dr. Herring.  Admits to sleeping "okay."  Last BM on 2/12, per chart review. Pt denies any bowel issues.   Surgical site with no erythema, or drainage.   No s/s of infection.  Plan for outpt follow up with NSGY and possible fluid tap.   Family meeting today.   Pt expressed that he would like to be DCd to his sister's home in [place].  No other complaints at this time.     Denies HA, dizziness, visual changes, CP, SOB, cough, nausea, abd pain, or urinary symptoms.      Therapy--observed ambulating with walker, ace wrap to right foot, > 100 ft, CGA      Vital Signs Last 24 Hrs  T(C): 36.4 (16 Feb 2023 08:20), Max: 36.8 (15 Feb 2023 21:32)  T(F): 97.6 (16 Feb 2023 08:20), Max: 98.3 (15 Feb 2023 21:32)  HR: 77 (16 Feb 2023 08:20) (72 - 77)  BP: 119/76 (16 Feb 2023 08:20) (118/80 - 121/76)  BP(mean): --  RR: 16 (16 Feb 2023 08:20) (16 - 16)  SpO2: 98% (16 Feb 2023 08:20) (95% - 98%)      PHYSICAL EXAM:  Constitutional -Comfortable  HEENT - NAD  Neck - Supple, No limited ROM  Pulm - resp nonlabored  Cardio - warm and well perfused   Abdomen -  Soft, NTND  Extremities - No peripheral edema, No calf tenderness     Neurologic Exam:                 Cognition - AOx4  Motor -                      LEFT    UE - ShAB 5/5, EF 5/5, EE 5/5, WE 5/5,  WNL                    RIGHT UE - ShAB 5/5, EF 5/5, EE 5/5, WE 5/5,  WNL                    LEFT    LE - HF 2/5, KE 3+/5, DF 1/5, PF 4/5 Strong hip extensors4+/5                    RIGHT LE - HF 2/5, KE 2+/5, DF 0/5, PF 4/5     Sensory - decrease sensation below the knee. R>L  Psychiatric - Mood stable, Affect WNL  Skin lumbar incision intact - healed, mild edema noted. No erythema or drainage or fluctuance            LAB                        12.8   7.13  )-----------( 235      ( 16 Feb 2023 06:30 )             38.5     02-16    138  |  100  |  19  ----------------------------<  109<H>  3.8   |  32<H>  |  0.64    Ca    9.2      16 Feb 2023 06:30    TPro  7.2  /  Alb  3.5  /  TBili  0.3  /  DBili  x   /  AST  36  /  ALT  71<H>  /  AlkPhos  66  02-16    LIVER FUNCTIONS - ( 16 Feb 2023 06:30 )  Alb: 3.5 g/dL / Pro: 7.2 g/dL / ALK PHOS: 66 U/L / ALT: 71 U/L / AST: 36 U/L / GGT: x               MEDICATIONS  (STANDING):  acetaminophen     Tablet .. 975 milliGRAM(s) Oral every 6 hours  aspirin enteric coated 81 milliGRAM(s) Oral daily  carvedilol 12.5 milliGRAM(s) Oral every 12 hours  enoxaparin Injectable 40 milliGRAM(s) SubCutaneous <User Schedule>  gabapentin 200 milliGRAM(s) Oral three times a day  lidocaine   4% Patch 1 Patch Transdermal daily  lidocaine   4% Patch 1 Patch Transdermal every 24 hours  losartan 100 milliGRAM(s) Oral daily  melatonin 6 milliGRAM(s) Oral at bedtime  methocarbamol 1500 milliGRAM(s) Oral three times a day  multivitamin 1 Tablet(s) Oral daily  polyethylene glycol 3350 17 Gram(s) Oral two times a day  senna 2 Tablet(s) Oral at bedtime  simvastatin 40 milliGRAM(s) Oral at bedtime  tamsulosin 0.4 milliGRAM(s) Oral at bedtime    MEDICATIONS  (PRN):  lidocaine 2% Jelly 3 milliLiter(s) IntraUrethral every 6 hours PRN For straight catheterization  magnesium hydroxide Suspension 30 milliLiter(s) Oral every 12 hours PRN Constipation  oxyCODONE    IR 5 milliGRAM(s) Oral every 4 hours PRN Moderate Pain (4 - 6)  oxyCODONE    IR 10 milliGRAM(s) Oral every 4 hours PRN Severe Pain (7 - 10)   CC: Cauda Equina    Subjective and objective:   Patient seen and evaluated during therapy this AM with Dr. Herring.  Admits to sleeping "okay."  Last BM on 2/12, per chart review. Pt denies any bowel issues.   Surgical site with no erythema, or drainage.   No s/s of infection.  Plan for outpt follow up with NSGY and possible fluid tap.   Family meeting today.   Pt expressed that he would like to be DCd to his sister's home in [place].  No other complaints at this time.     Denies HA, dizziness, visual changes, CP, SOB, cough, nausea, abd pain, or urinary symptoms.      Therapy--observed ambulating with walker, ace wrap to right foot, > 100 ft, CGA      Vital Signs Last 24 Hrs  T(C): 36.4 (16 Feb 2023 08:20), Max: 36.8 (15 Feb 2023 21:32)  T(F): 97.6 (16 Feb 2023 08:20), Max: 98.3 (15 Feb 2023 21:32)  HR: 77 (16 Feb 2023 08:20) (72 - 77)  BP: 119/76 (16 Feb 2023 08:20) (118/80 - 121/76)  RR: 16 (16 Feb 2023 08:20) (16 - 16)  SpO2: 98% (16 Feb 2023 08:20) (95% - 98%)      PHYSICAL EXAM:  Constitutional -Comfortable  HEENT - NAD  Neck - Supple, No limited ROM  Pulm - resp nonlabored  Cardio - warm and well perfused   Abdomen -  Soft, NTND  Extremities - No peripheral edema, No calf tenderness     Neurologic Exam:                 Cognition - AOx4  Motor -                      LEFT    UE - ShAB 5/5, EF 5/5, EE 5/5, WE 5/5,  WNL                    RIGHT UE - ShAB 5/5, EF 5/5, EE 5/5, WE 5/5,  WNL                    LEFT    LE - HF 2/5, KE 3+/5, DF 1/5, PF 4/5 Strong hip extensors4+/5                    RIGHT LE - HF 2/5, KE 2+/5, DF 0/5, PF 4/5     Sensory - decrease sensation below the knee. R>L  Psychiatric - Mood stable, Affect WNL  Skin lumbar incision intact - healed, mild edema noted. No erythema or drainage or fluctuance            LAB                        12.8   7.13  )-----------( 235      ( 16 Feb 2023 06:30 )             38.5     02-16    138  |  100  |  19  ----------------------------<  109<H>  3.8   |  32<H>  |  0.64    Ca    9.2      16 Feb 2023 06:30    TPro  7.2  /  Alb  3.5  /  TBili  0.3  /  DBili  x   /  AST  36  /  ALT  71<H>  /  AlkPhos  66  02-16    LIVER FUNCTIONS - ( 16 Feb 2023 06:30 )  Alb: 3.5 g/dL / Pro: 7.2 g/dL / ALK PHOS: 66 U/L / ALT: 71 U/L / AST: 36 U/L / GGT: x               MEDICATIONS  (STANDING):  acetaminophen     Tablet .. 975 milliGRAM(s) Oral every 6 hours  aspirin enteric coated 81 milliGRAM(s) Oral daily  carvedilol 12.5 milliGRAM(s) Oral every 12 hours  enoxaparin Injectable 40 milliGRAM(s) SubCutaneous <User Schedule>  gabapentin 200 milliGRAM(s) Oral three times a day  lidocaine   4% Patch 1 Patch Transdermal daily  lidocaine   4% Patch 1 Patch Transdermal every 24 hours  losartan 100 milliGRAM(s) Oral daily  melatonin 6 milliGRAM(s) Oral at bedtime  methocarbamol 1500 milliGRAM(s) Oral three times a day  multivitamin 1 Tablet(s) Oral daily  polyethylene glycol 3350 17 Gram(s) Oral two times a day  senna 2 Tablet(s) Oral at bedtime  simvastatin 40 milliGRAM(s) Oral at bedtime  tamsulosin 0.4 milliGRAM(s) Oral at bedtime    MEDICATIONS  (PRN):  lidocaine 2% Jelly 3 milliLiter(s) IntraUrethral every 6 hours PRN For straight catheterization  magnesium hydroxide Suspension 30 milliLiter(s) Oral every 12 hours PRN Constipation  oxyCODONE    IR 5 milliGRAM(s) Oral every 4 hours PRN Moderate Pain (4 - 6)  oxyCODONE    IR 10 milliGRAM(s) Oral every 4 hours PRN Severe Pain (7 - 10)   PROVIDER:[TOKEN:[[id number]]]

## 2025-01-20 NOTE — DISCHARGE NOTE OB - FINANCIAL ASSISTANCE
[facility] provides services at a reduced cost to those who are determined to be eligible through [facility]’s financial assistance program. Information regarding [facility]’s financial assistance program can be found by going to [email or web address] or by calling [phone].

## 2025-01-20 NOTE — DISCHARGE NOTE OB - MATERIALS PROVIDED
[facility] [place] Screening Program/[place]  Immunization Record/Guide to Postpartum Care/Shaken Baby Prevention Handout/Discharge Medication Information for Patients and Families Pocket Guide

## 2025-01-20 NOTE — DISCHARGE NOTE OB - MEDICATION SUMMARY - MEDICATIONS TO TAKE
I will START or STAY ON the medications listed below when I get home from the hospital:    ibuprofen 600 mg oral tablet  -- 1 tab(s) by mouth every 6 hours  -- Indication: For pain    acetaminophen 325 mg oral tablet  -- 3 tab(s) by mouth 4 times a day  -- Indication: For pain    Albuterol (Eqv-Proventil HFA) 90 mcg/inh inhalation aerosol  -- 2 puff(s) inhaled prn as needed for  shortness of breath and/or wheezing  -- Indication: For asthma    multivitamin, prenatal  -- 1 tab(s) by mouth once a day  -- Indication: For vitamins   I will START or STAY ON the medications listed below when I get home from the hospital:    ibuprofen 600 mg oral tablet  -- 1 tab(s) by mouth every 6 hours as needed for  moderate pain  -- Indication: For pain    acetaminophen 325 mg oral tablet  -- 3 tab(s) by mouth every 6 hours as needed for  mild pain  -- Indication: For pain    Albuterol (Eqv-Proventil HFA) 90 mcg/inh inhalation aerosol  -- 2 puff(s) inhaled prn as needed for  shortness of breath and/or wheezing  -- Indication: For asthma    multivitamin, prenatal  -- 1 tab(s) by mouth once a day  -- Indication: For vitamins

## 2025-01-20 NOTE — DISCHARGE NOTE OB - NS MD DC FALL RISK RISK
For information on Fall & Injury Prevention, visit: [email or web address] OR  [email or web address] OR  https://www.cdc.gov/steadi/patient.html

## 2025-01-20 NOTE — DISCHARGE NOTE OB - PLAN OF CARE
return 1 week After discharge, please stay on pelvic rest for 6 weeks, meaning no sexual intercourse, no tampons and no douching.  No driving for 2 weeks as women can loose a lot of blood during delivery and there is a possibility of being lightheaded/fainting.  No lifting objects heavier than baby for two weeks.  Expect to have vaginal bleeding/spotting for up to six weeks.  The bleeding should get lighter and more white/light brown with time.  For bleeding soaking more than a pad an hour or passing clots greater than the size of your fist, come in to the emergency department.    Follow up in OB office in 1-2 weeks for incision check.  Call for noticeable increase in redness or swelling at incision, discharge from incision, or opening of skin at incision site

## 2025-01-20 NOTE — DISCHARGE NOTE OB - CARE PROVIDER_API CALL
Artem Vann  Obstetrics and Gynecology  [address], [address]  [place] [address]  Phone: [phone]  Fax: [phone]  Follow Up Time:

## 2025-01-20 NOTE — DISCHARGE NOTE OB - CARE PLAN
Principal Discharge DX:	 delivery delivered  Assessment and plan of treatment:	return 1 week   1 Principal Discharge DX:	 delivery delivered  Assessment and plan of treatment:	After discharge, please stay on pelvic rest for 6 weeks, meaning no sexual intercourse, no tampons and no douching.  No driving for 2 weeks as women can loose a lot of blood during delivery and there is a possibility of being lightheaded/fainting.  No lifting objects heavier than baby for two weeks.  Expect to have vaginal bleeding/spotting for up to six weeks.  The bleeding should get lighter and more white/light brown with time.  For bleeding soaking more than a pad an hour or passing clots greater than the size of your fist, come in to the emergency department.    Follow up in OB office in 1-2 weeks for incision check.  Call for noticeable increase in redness or swelling at incision, discharge from incision, or opening of skin at incision site

## 2025-01-20 NOTE — DISCHARGE NOTE OB - PATIENT PORTAL LINK FT
You can access the FollowMyHealth Patient Portal offered by [facility] by registering at the following website: [email or web address]. By joining [facility]’s FollowMyHealth portal, you will also be able to view your health information using other applications (apps) compatible with our system.

## 2025-01-20 NOTE — PROGRESS NOTE ADULT - SUBJECTIVE AND OBJECTIVE BOX
OB Progress Note:  Delivery, POD#1    S: 28yo POD#1 s/p LTCS . Her pain is well controlled. She is tolerating a regular diet. Not yet passing flatus. Denies N/V. Denies CP/SOB/lightheadedness/dizziness. Denies fever/chills.  She is ambulating without difficulty.   Voiding spontaneously.     O:   Vital Signs Last 24 Hrs  T(C): 36.7 (:), Max: 36.7 (:)  T(F): 98 (:), Max: 98 (:)  HR: 77 () (69 - 117)  BP: 108/56 () (94/61 - 143/86)  BP(mean): 87 (2025 15:30) (57 - 101)  RR: 18 () (11 - 23)  SpO2: 97% () (97% - 100%)    Parameters below as of :  Patient On (Oxygen Delivery Method): room air        Labs:  Blood type: A Positive  Rubella IgG: RPR:                           13.4   10.81[H] >-----------< 200    (  @ 07:15 )             40.3                  PE:  General: NAD  Abdomen: Mildly distended, appropriately tender, incision c/d/i.  Extremities: No erythema, no pitting edema     OB Progress Note:  Delivery, POD#1    S: 30yo POD#1 s/p pLTCS. Her pain is well controlled. She is tolerating a regular diet. Not yet passing flatus. Denies N/V. Denies CP/SOB/lightheadedness/dizziness. Denies fever/chills.  She is ambulating without difficulty.   Voiding spontaneously.     O:   Vital Signs Last 24 Hrs  T(C): 36.7 (:), Max: 36.7 (:)  T(F): 98 (:), Max: 98 (:)  HR: 77 () (69 - 117)  BP: 108/56 () (94/61 - 143/86)  BP(mean): 87 (2025 15:30) (57 - 101)  RR: 18 () (11 - 23)  SpO2: 97% () (97% - 100%)    Parameters below as of :  Patient On (Oxygen Delivery Method): room air        Labs:  Blood type: A Positive  Rubella IgG: RPR:                           13.4   10.81[H] >-----------< 200    (  @ 07:15 )             40.3                  PE:  General: NAD  Abdomen: Mildly distended, appropriately tender, incision c/d/i.  Extremities: No erythema, no pitting edema

## 2025-01-20 NOTE — DISCHARGE NOTE OB - CARE PROVIDERS DIRECT ADDRESSES
[email or web address]

## 2025-01-20 NOTE — PROGRESS NOTE ADULT - SUBJECTIVE AND OBJECTIVE BOX
Postpartum Note,  Section  She is a  29y woman who is now post-operative day: 1    Subjective:  The patient feels well.  She is ambulating.   She is tolerating regular diet.  She denies nausea and vomiting.  She is voiding.  Her pain is controlled.  She reports normal postpartum bleeding.        Physical exam:    Vital Signs Last 24 Hrs  T(C): 36.7 (2025 05:57), Max: 36.7 (2025 01:33)  T(F): 98 (2025 05:57), Max: 98 (2025 01:33)  HR: 76 (2025 05:57) (69 - 86)  BP: 112/68 (2025 05:57) (94/61 - 143/86)  BP(mean): 87 (2025 15:30) (57 - 101)  RR: 18 (2025 05:57) (11 - 23)  SpO2: 98% (2025 05:57) (97% - 100%)    Parameters below as of 2025 05:57  Patient On (Oxygen Delivery Method): room air        Gen: NAD  Breast: Soft, nontender, not engorged.  Abdomen: Soft, nontender, no distension , firm uterine fundus at umbilicus.  Incision: Clean, dry, and intact   Pelvic: Normal lochia noted  Ext: No calf tenderness    LABS:                        10.8   10.46 )-----------( 148      ( 2025 05:10 )             32.5                         13.4   10.81 )-----------( 200      ( 2025 07:15 )             40.3                   Allergies    No Known Allergies    Intolerances      MEDICATIONS  (STANDING):  acetaminophen     Tablet .. 975 milliGRAM(s) Oral <User Schedule>  diphtheria/tetanus/pertussis (acellular) Vaccine (Adacel) 0.5 milliLiter(s) IntraMuscular once  heparin   Injectable 78012 Unit(s) SubCutaneous every 12 hours  ibuprofen  Tablet. 600 milliGRAM(s) Oral every 6 hours  influenza   Vaccine 0.5 milliLiter(s) IntraMuscular once  ketorolac   Injectable 30 milliGRAM(s) IV Push every 6 hours    MEDICATIONS  (PRN):  diphenhydrAMINE 25 milliGRAM(s) Oral every 6 hours PRN Pruritus  lanolin Ointment 1 Application(s) Topical every 6 hours PRN Sore Nipples  magnesium hydroxide Suspension 30 milliLiter(s) Oral two times a day PRN Constipation  oxyCODONE    IR 5 milliGRAM(s) Oral every 3 hours PRN Moderate to Severe Pain (4-10)  oxyCODONE    IR 5 milliGRAM(s) Oral once PRN Moderate to Severe Pain (4-10)  simethicone 80 milliGRAM(s) Chew every 4 hours PRN Gas        Assessment and Plan  POD #1 s/p  section  Doing well.  Encourage ambulation.

## 2025-01-20 NOTE — PROGRESS NOTE ADULT - SUBJECTIVE AND OBJECTIVE BOX
ANESTHESIA POSTOP CHECK    29y Female POSTOP DAY 1 S/P     Vital Signs Last 24 Hrs  T(C): 36.7 (20 Jan 2025 05:57), Max: 36.7 (20 Jan 2025 01:33)  T(F): 98 (20 Jan 2025 05:57), Max: 98 (20 Jan 2025 01:33)  HR: 76 (20 Jan 2025 05:57) (69 - 86)  BP: 112/68 (20 Jan 2025 05:57) (94/61 - 143/86)  BP(mean): 87 (19 Jan 2025 15:30) (57 - 101)  RR: 18 (20 Jan 2025 05:57) (11 - 23)  SpO2: 98% (20 Jan 2025 05:57) (97% - 100%)    Parameters below as of 20 Jan 2025 05:57  Patient On (Oxygen Delivery Method): room air      I&O's Summary    19 Jan 2025 07:01  -  20 Jan 2025 07:00  --------------------------------------------------------  IN: 4650 mL / OUT: 2192 mL / NET: 2458 mL        [ x] NO APPARENT ANESTHESIA COMPLICATIONS

## 2025-01-20 NOTE — PROGRESS NOTE ADULT - ASSESSMENT
A/P: 30yo POD#1 s/p elective pLTCS d/t suspected macrosomia. . Patient is stable and doing well post-operatively. VSS, AF.   - Continue regular diet.  - Increase ambulation.  - Continue motrin, tylenol, oxycodone PRN for pain control.  - F/u AM CBC    Reema Marin PGY1

## 2025-01-21 VITALS
TEMPERATURE: 98 F | HEART RATE: 86 BPM | DIASTOLIC BLOOD PRESSURE: 77 MMHG | RESPIRATION RATE: 18 BRPM | SYSTOLIC BLOOD PRESSURE: 117 MMHG | OXYGEN SATURATION: 99 %

## 2025-01-21 LAB — T PALLIDUM AB TITR SER: NEGATIVE — SIGNIFICANT CHANGE UP

## 2025-01-21 RX ORDER — CLOSTRIDIUM TETANI TOXOID ANTIGEN (FORMALDEHYDE INACTIVATED), CORYNEBACTERIUM DIPHTHERIAE TOXOID ANTIGEN (FORMALDEHYDE INACTIVATED), BORDETELLA PERTUSSIS TOXOID ANTIGEN (GLUTARALDEHYDE INACTIVATED), BORDETELLA PERTUSSIS FILAMENTOUS HEMAGGLUTININ ANTIGEN (FORMALDEHYDE INACTIVATED), BORDETELLA PERTUSSIS PERTACTIN ANTIGEN, AND BORDETELLA PERTUSSIS FIMBRIAE 2/3 ANTIGEN 5; 2; 2.5; 5; 3; 5 [LF]/.5ML; [LF]/.5ML; UG/.5ML; UG/.5ML; UG/.5ML; UG/.5ML
0.5 INJECTION, SUSPENSION INTRAMUSCULAR ONCE
Refills: 0 | Status: COMPLETED | OUTPATIENT
Start: 2025-01-21 | End: 2025-01-21

## 2025-01-21 RX ADMIN — ACETAMINOPHEN 975 MILLIGRAM(S): 160 SUSPENSION ORAL at 14:57

## 2025-01-21 RX ADMIN — ACETAMINOPHEN 975 MILLIGRAM(S): 160 SUSPENSION ORAL at 03:40

## 2025-01-21 RX ADMIN — Medication 10000 UNIT(S): at 11:41

## 2025-01-21 RX ADMIN — ACETAMINOPHEN 975 MILLIGRAM(S): 160 SUSPENSION ORAL at 09:15

## 2025-01-21 RX ADMIN — IBUPROFEN 600 MILLIGRAM(S): 600 TABLET, FILM COATED ORAL at 11:40

## 2025-01-21 RX ADMIN — IBUPROFEN 600 MILLIGRAM(S): 600 TABLET, FILM COATED ORAL at 12:28

## 2025-01-21 RX ADMIN — ACETAMINOPHEN 975 MILLIGRAM(S): 160 SUSPENSION ORAL at 08:19

## 2025-01-21 RX ADMIN — IBUPROFEN 600 MILLIGRAM(S): 600 TABLET, FILM COATED ORAL at 06:40

## 2025-01-21 RX ADMIN — Medication 10000 UNIT(S): at 00:12

## 2025-01-21 RX ADMIN — ACETAMINOPHEN 975 MILLIGRAM(S): 160 SUSPENSION ORAL at 15:34

## 2025-01-21 RX ADMIN — ACETAMINOPHEN 975 MILLIGRAM(S): 160 SUSPENSION ORAL at 03:06

## 2025-01-21 RX ADMIN — IBUPROFEN 600 MILLIGRAM(S): 600 TABLET, FILM COATED ORAL at 06:05

## 2025-01-21 NOTE — PROGRESS NOTE ADULT - SUBJECTIVE AND OBJECTIVE BOX
Post Op C/S 2      Pt without complaints    Vital Signs Last 24 Hrs  T(C): 36.7 (21 Jan 2025 02:00), Max: 36.7 (20 Jan 2025 17:49)  T(F): 98 (21 Jan 2025 02:00), Max: 98 (20 Jan 2025 17:49)  HR: 74 (21 Jan 2025 02:00) (74 - 83)  BP: 117/79 (21 Jan 2025 02:00) (108/50 - 117/79)  BP(mean): --  RR: 18 (21 Jan 2025 02:00) (18 - 18)  SpO2: 97% (21 Jan 2025 02:00) (97% - 100%)    Parameters below as of 20 Jan 2025 17:49  Patient On (Oxygen Delivery Method): room air      MEDICATIONS  (STANDING):  acetaminophen     Tablet .. 975 milliGRAM(s) Oral <User Schedule>  heparin   Injectable 74374 Unit(s) SubCutaneous every 12 hours  ibuprofen  Tablet. 600 milliGRAM(s) Oral every 6 hours  influenza   Vaccine 0.5 milliLiter(s) IntraMuscular once    MEDICATIONS  (PRN):  diphenhydrAMINE 25 milliGRAM(s) Oral every 6 hours PRN Pruritus  lanolin Ointment 1 Application(s) Topical every 6 hours PRN Sore Nipples  magnesium hydroxide Suspension 30 milliLiter(s) Oral two times a day PRN Constipation  oxyCODONE    IR 5 milliGRAM(s) Oral every 3 hours PRN Moderate to Severe Pain (4-10)  oxyCODONE    IR 5 milliGRAM(s) Oral once PRN Moderate to Severe Pain (4-10)  simethicone 80 milliGRAM(s) Chew every 4 hours PRN Gas        Abdomen soft  fundus firm  Incision clean dry and intact  extremities non tender  lochia wnl                          10.8   10.46 )-----------( 148      ( 20 Jan 2025 05:10 )             32.5     Patient doing well    Routine post operative care

## (undated) DEVICE — DRSG DERMABOND PRINEO 22CM